# Patient Record
Sex: MALE | Race: BLACK OR AFRICAN AMERICAN | NOT HISPANIC OR LATINO | Employment: UNEMPLOYED | URBAN - NONMETROPOLITAN AREA
[De-identification: names, ages, dates, MRNs, and addresses within clinical notes are randomized per-mention and may not be internally consistent; named-entity substitution may affect disease eponyms.]

---

## 2017-07-08 ENCOUNTER — APPOINTMENT (OUTPATIENT)
Dept: CT IMAGING | Facility: HOSPITAL | Age: 82
End: 2017-07-08

## 2017-07-08 ENCOUNTER — HOSPITAL ENCOUNTER (INPATIENT)
Facility: HOSPITAL | Age: 82
LOS: 4 days | Discharge: HOME | End: 2017-07-12
Attending: HOSPITALIST | Admitting: HOSPITALIST

## 2017-07-08 ENCOUNTER — APPOINTMENT (OUTPATIENT)
Dept: CARDIOLOGY | Facility: HOSPITAL | Age: 82
End: 2017-07-08
Attending: INTERNAL MEDICINE

## 2017-07-08 PROBLEM — I48.91 ATRIAL FIBRILLATION (HCC): Status: ACTIVE | Noted: 2017-07-08

## 2017-07-08 LAB
ALBUMIN SERPL-MCNC: 3.5 G/DL (ref 3.4–4.8)
ALBUMIN/GLOB SERPL: 1.2 G/DL (ref 1.1–1.8)
ALP SERPL-CCNC: 76 U/L (ref 38–126)
ALT SERPL W P-5'-P-CCNC: 47 U/L (ref 21–72)
ANION GAP SERPL CALCULATED.3IONS-SCNC: 9 MMOL/L (ref 5–15)
APTT PPP: 105.2 SECONDS (ref 20–40.3)
APTT PPP: 105.9 SECONDS (ref 20–40.3)
APTT PPP: 67.2 SECONDS (ref 20–40.3)
APTT PPP: 72.2 SECONDS (ref 20–40.3)
AST SERPL-CCNC: 44 U/L (ref 17–59)
BH CV ECHO MEAS - AO MAX PG (FULL): 4 MMHG
BH CV ECHO MEAS - AO MAX PG: 11.1 MMHG
BH CV ECHO MEAS - AO MEAN PG (FULL): 3.2 MMHG
BH CV ECHO MEAS - AO MEAN PG: 7.5 MMHG
BH CV ECHO MEAS - AO ROOT AREA: 7.3 CM^2
BH CV ECHO MEAS - AO ROOT DIAM: 3 CM
BH CV ECHO MEAS - AO V2 MAX: 167 CM/SEC
BH CV ECHO MEAS - AO V2 MEAN: 133.1 CM/SEC
BH CV ECHO MEAS - AO V2 VTI: 28.6 CM
BH CV ECHO MEAS - AVA(I,A): 2.3 CM^2
BH CV ECHO MEAS - AVA(I,D): 2.3 CM^2
BH CV ECHO MEAS - AVA(V,A): 2.7 CM^2
BH CV ECHO MEAS - AVA(V,D): 2.7 CM^2
BH CV ECHO MEAS - EDV(CUBED): 111.3 ML
BH CV ECHO MEAS - EDV(TEICH): 108 ML
BH CV ECHO MEAS - EF(CUBED): 40.5 %
BH CV ECHO MEAS - EF(MOD-SP4): 40 %
BH CV ECHO MEAS - EF(TEICH): 33.4 %
BH CV ECHO MEAS - ESV(CUBED): 66.3 ML
BH CV ECHO MEAS - ESV(TEICH): 72 ML
BH CV ECHO MEAS - FS: 15.9 %
BH CV ECHO MEAS - IVS/LVPW: 0.9
BH CV ECHO MEAS - IVSD: 1.5 CM
BH CV ECHO MEAS - LA DIMENSION: 6 CM
BH CV ECHO MEAS - LA/AO: 2
BH CV ECHO MEAS - LV MASS(C)D: 335 GRAMS
BH CV ECHO MEAS - LV MAX PG: 7.1 MMHG
BH CV ECHO MEAS - LV MEAN PG: 4.3 MMHG
BH CV ECHO MEAS - LV V1 MAX: 133.5 CM/SEC
BH CV ECHO MEAS - LV V1 MEAN: 96.8 CM/SEC
BH CV ECHO MEAS - LV V1 VTI: 19.8 CM
BH CV ECHO MEAS - LVIDD: 4.8 CM
BH CV ECHO MEAS - LVIDS: 4 CM
BH CV ECHO MEAS - LVOT AREA (M): 3.5 CM^2
BH CV ECHO MEAS - LVOT AREA: 3.3 CM^2
BH CV ECHO MEAS - LVOT DIAM: 2.1 CM
BH CV ECHO MEAS - LVPWD: 1.7 CM
BH CV ECHO MEAS - MR MAX PG: 100.7 MMHG
BH CV ECHO MEAS - MR MAX VEL: 501.7 CM/SEC
BH CV ECHO MEAS - MV A MAX VEL: 34.5 CM/SEC
BH CV ECHO MEAS - MV DEC SLOPE: 623.1 CM/SEC^2
BH CV ECHO MEAS - MV E MAX VEL: 108.3 CM/SEC
BH CV ECHO MEAS - MV E/A: 3.1
BH CV ECHO MEAS - MV P1/2T MAX VEL: 107.5 CM/SEC
BH CV ECHO MEAS - MV P1/2T: 50.5 MSEC
BH CV ECHO MEAS - MVA P1/2T LCG: 2 CM^2
BH CV ECHO MEAS - MVA(P1/2T): 4.4 CM^2
BH CV ECHO MEAS - PA MAX PG: 1.7 MMHG
BH CV ECHO MEAS - PA V2 MAX: 64.5 CM/SEC
BH CV ECHO MEAS - RAP SYSTOLE: 10 MMHG
BH CV ECHO MEAS - RVSP: 52.9 MMHG
BH CV ECHO MEAS - SV(AO): 207.8 ML
BH CV ECHO MEAS - SV(CUBED): 45 ML
BH CV ECHO MEAS - SV(LVOT): 66 ML
BH CV ECHO MEAS - SV(TEICH): 36.1 ML
BH CV ECHO MEAS - TR MAX VEL: 327.4 CM/SEC
BILIRUB SERPL-MCNC: 0.6 MG/DL (ref 0.2–1.3)
BUN BLD-MCNC: 22 MG/DL (ref 7–21)
BUN/CREAT SERPL: 21.8 (ref 7–25)
CALCIUM SPEC-SCNC: 8.1 MG/DL (ref 8.4–10.2)
CHLORIDE SERPL-SCNC: 98 MMOL/L (ref 95–110)
CK MB SERPL-CCNC: 2.35 NG/ML (ref 0–5)
CK SERPL-CCNC: 87 U/L (ref 55–170)
CO2 SERPL-SCNC: 27 MMOL/L (ref 22–31)
CREAT BLD-MCNC: 1.01 MG/DL (ref 0.7–1.3)
D-DIMER, QUANTITATIVE (MAD,POW, STR): 1643 NG/ML (FEU) (ref 0–470)
DEPRECATED RDW RBC AUTO: 51.4 FL (ref 35.1–43.9)
ERYTHROCYTE [DISTWIDTH] IN BLOOD BY AUTOMATED COUNT: 14.3 % (ref 11.5–14.5)
GFR SERPL CREATININE-BSD FRML MDRD: 84 ML/MIN/1.73 (ref 42–98)
GLOBULIN UR ELPH-MCNC: 3 GM/DL (ref 2.3–3.5)
GLUCOSE BLD-MCNC: 116 MG/DL (ref 60–100)
HCT VFR BLD AUTO: 33.5 % (ref 39–49)
HGB BLD-MCNC: 11.2 G/DL (ref 13.7–17.3)
INR PPP: 1.18 (ref 0.8–1.2)
LV EF 2D ECHO EST: 36 %
MCH RBC QN AUTO: 32.8 PG (ref 26.5–34)
MCHC RBC AUTO-ENTMCNC: 33.4 G/DL (ref 31.5–36.3)
MCV RBC AUTO: 98.2 FL (ref 80–98)
NT-PROBNP SERPL-MCNC: 4050 PG/ML (ref 0–1800)
PLATELET # BLD AUTO: 173 10*3/MM3 (ref 150–450)
PMV BLD AUTO: 10.9 FL (ref 8–12)
POTASSIUM BLD-SCNC: 4.7 MMOL/L (ref 3.5–5.1)
PROT SERPL-MCNC: 6.5 G/DL (ref 6.3–8.6)
PROTHROMBIN TIME: 15 SECONDS (ref 11.1–15.3)
RBC # BLD AUTO: 3.41 10*6/MM3 (ref 4.37–5.74)
SODIUM BLD-SCNC: 134 MMOL/L (ref 137–145)
TROPONIN I SERPL-MCNC: 0.17 NG/ML
TROPONIN I SERPL-MCNC: 0.18 NG/ML
TROPONIN I SERPL-MCNC: 0.2 NG/ML
WBC NRBC COR # BLD: 5.88 10*3/MM3 (ref 3.2–9.8)

## 2017-07-08 PROCEDURE — 80053 COMPREHEN METABOLIC PANEL: CPT | Performed by: HOSPITALIST

## 2017-07-08 PROCEDURE — 85610 PROTHROMBIN TIME: CPT | Performed by: HOSPITALIST

## 2017-07-08 PROCEDURE — 25010000002 ONDANSETRON PER 1 MG: Performed by: HOSPITALIST

## 2017-07-08 PROCEDURE — 93010 ELECTROCARDIOGRAM REPORT: CPT | Performed by: INTERNAL MEDICINE

## 2017-07-08 PROCEDURE — 82550 ASSAY OF CK (CPK): CPT | Performed by: HOSPITALIST

## 2017-07-08 PROCEDURE — 84484 ASSAY OF TROPONIN QUANT: CPT | Performed by: HOSPITALIST

## 2017-07-08 PROCEDURE — 93005 ELECTROCARDIOGRAM TRACING: CPT | Performed by: HOSPITALIST

## 2017-07-08 PROCEDURE — 93306 TTE W/DOPPLER COMPLETE: CPT | Performed by: INTERNAL MEDICINE

## 2017-07-08 PROCEDURE — 85730 THROMBOPLASTIN TIME PARTIAL: CPT | Performed by: HOSPITALIST

## 2017-07-08 PROCEDURE — 83880 ASSAY OF NATRIURETIC PEPTIDE: CPT | Performed by: HOSPITALIST

## 2017-07-08 PROCEDURE — 25010000002 HEPARIN (PORCINE) PER 1000 UNITS: Performed by: HOSPITALIST

## 2017-07-08 PROCEDURE — 74176 CT ABD & PELVIS W/O CONTRAST: CPT

## 2017-07-08 PROCEDURE — 99233 SBSQ HOSP IP/OBS HIGH 50: CPT | Performed by: INTERNAL MEDICINE

## 2017-07-08 PROCEDURE — 93306 TTE W/DOPPLER COMPLETE: CPT

## 2017-07-08 PROCEDURE — 85379 FIBRIN DEGRADATION QUANT: CPT | Performed by: HOSPITALIST

## 2017-07-08 PROCEDURE — 82553 CREATINE MB FRACTION: CPT | Performed by: HOSPITALIST

## 2017-07-08 PROCEDURE — 85027 COMPLETE CBC AUTOMATED: CPT | Performed by: HOSPITALIST

## 2017-07-08 RX ORDER — ONDANSETRON 4 MG/1
4 TABLET, ORALLY DISINTEGRATING ORAL EVERY 6 HOURS PRN
Status: DISCONTINUED | OUTPATIENT
Start: 2017-07-08 | End: 2017-07-12 | Stop reason: HOSPADM

## 2017-07-08 RX ORDER — MORPHINE SULFATE 2 MG/ML
1 INJECTION, SOLUTION INTRAMUSCULAR; INTRAVENOUS EVERY 4 HOURS PRN
Status: DISCONTINUED | OUTPATIENT
Start: 2017-07-08 | End: 2017-07-12 | Stop reason: HOSPADM

## 2017-07-08 RX ORDER — HEPARIN SODIUM 5000 [USP'U]/ML
3000 INJECTION, SOLUTION INTRAVENOUS; SUBCUTANEOUS AS NEEDED
Status: DISCONTINUED | OUTPATIENT
Start: 2017-07-08 | End: 2017-07-09

## 2017-07-08 RX ORDER — ATORVASTATIN CALCIUM 10 MG/1
10 TABLET, FILM COATED ORAL NIGHTLY
COMMUNITY

## 2017-07-08 RX ORDER — ONDANSETRON 4 MG/1
4 TABLET, FILM COATED ORAL EVERY 6 HOURS PRN
Status: DISCONTINUED | OUTPATIENT
Start: 2017-07-08 | End: 2017-07-12 | Stop reason: HOSPADM

## 2017-07-08 RX ORDER — SODIUM CHLORIDE 0.9 % (FLUSH) 0.9 %
1-10 SYRINGE (ML) INJECTION AS NEEDED
Status: DISCONTINUED | OUTPATIENT
Start: 2017-07-08 | End: 2017-07-12 | Stop reason: HOSPADM

## 2017-07-08 RX ORDER — HEPARIN SODIUM 10000 [USP'U]/100ML
1000 INJECTION, SOLUTION INTRAVENOUS
Status: DISCONTINUED | OUTPATIENT
Start: 2017-07-08 | End: 2017-07-09

## 2017-07-08 RX ORDER — TAMSULOSIN HYDROCHLORIDE 0.4 MG/1
1 CAPSULE ORAL NIGHTLY
COMMUNITY

## 2017-07-08 RX ORDER — NALOXONE HCL 0.4 MG/ML
0.4 VIAL (ML) INJECTION
Status: DISCONTINUED | OUTPATIENT
Start: 2017-07-08 | End: 2017-07-12 | Stop reason: HOSPADM

## 2017-07-08 RX ORDER — HEPARIN SODIUM 5000 [USP'U]/ML
2000 INJECTION, SOLUTION INTRAVENOUS; SUBCUTANEOUS AS NEEDED
Status: DISCONTINUED | OUTPATIENT
Start: 2017-07-08 | End: 2017-07-09

## 2017-07-08 RX ORDER — ATORVASTATIN CALCIUM 10 MG/1
10 TABLET, FILM COATED ORAL NIGHTLY
Status: DISCONTINUED | OUTPATIENT
Start: 2017-07-08 | End: 2017-07-12 | Stop reason: HOSPADM

## 2017-07-08 RX ORDER — AMLODIPINE BESYLATE 5 MG/1
5 TABLET ORAL DAILY
Status: DISCONTINUED | OUTPATIENT
Start: 2017-07-08 | End: 2017-07-08

## 2017-07-08 RX ORDER — POLYETHYLENE GLYCOL 3350 17 G/17G
17 POWDER, FOR SOLUTION ORAL DAILY PRN
Status: DISCONTINUED | OUTPATIENT
Start: 2017-07-08 | End: 2017-07-12 | Stop reason: HOSPADM

## 2017-07-08 RX ORDER — ONDANSETRON 4 MG/1
4 TABLET, ORALLY DISINTEGRATING ORAL EVERY 6 HOURS PRN
Status: DISCONTINUED | OUTPATIENT
Start: 2017-07-08 | End: 2017-07-08 | Stop reason: SDUPTHER

## 2017-07-08 RX ORDER — AMLODIPINE BESYLATE 5 MG/1
5 TABLET ORAL DAILY
COMMUNITY
End: 2017-07-12 | Stop reason: HOSPADM

## 2017-07-08 RX ORDER — TAMSULOSIN HYDROCHLORIDE 0.4 MG/1
0.4 CAPSULE ORAL NIGHTLY
Status: DISCONTINUED | OUTPATIENT
Start: 2017-07-08 | End: 2017-07-12 | Stop reason: HOSPADM

## 2017-07-08 RX ORDER — ONDANSETRON 2 MG/ML
4 INJECTION INTRAMUSCULAR; INTRAVENOUS EVERY 6 HOURS PRN
Status: DISCONTINUED | OUTPATIENT
Start: 2017-07-08 | End: 2017-07-12 | Stop reason: HOSPADM

## 2017-07-08 RX ORDER — ONDANSETRON 4 MG/1
4 TABLET, FILM COATED ORAL EVERY 6 HOURS PRN
Status: DISCONTINUED | OUTPATIENT
Start: 2017-07-08 | End: 2017-07-08 | Stop reason: SDUPTHER

## 2017-07-08 RX ORDER — ONDANSETRON 2 MG/ML
4 INJECTION INTRAMUSCULAR; INTRAVENOUS EVERY 6 HOURS PRN
Status: DISCONTINUED | OUTPATIENT
Start: 2017-07-08 | End: 2017-07-08 | Stop reason: SDUPTHER

## 2017-07-08 RX ORDER — POLYETHYLENE GLYCOL 3350 17 G/17G
17 POWDER, FOR SOLUTION ORAL DAILY PRN
COMMUNITY

## 2017-07-08 RX ORDER — ISOSORBIDE MONONITRATE 30 MG/1
30 TABLET, EXTENDED RELEASE ORAL
Status: DISCONTINUED | OUTPATIENT
Start: 2017-07-08 | End: 2017-07-12 | Stop reason: HOSPADM

## 2017-07-08 RX ORDER — ASPIRIN 81 MG/1
81 TABLET ORAL DAILY
Status: DISCONTINUED | OUTPATIENT
Start: 2017-07-08 | End: 2017-07-12 | Stop reason: HOSPADM

## 2017-07-08 RX ADMIN — DILTIAZEM HYDROCHLORIDE 5 MG/HR: 5 INJECTION INTRAVENOUS at 12:14

## 2017-07-08 RX ADMIN — ONDANSETRON 4 MG: 2 INJECTION INTRAMUSCULAR; INTRAVENOUS at 12:03

## 2017-07-08 RX ADMIN — TAMSULOSIN HYDROCHLORIDE 0.4 MG: 0.4 CAPSULE ORAL at 20:50

## 2017-07-08 RX ADMIN — ONDANSETRON 4 MG: 4 TABLET, ORALLY DISINTEGRATING ORAL at 06:53

## 2017-07-08 RX ADMIN — METOPROLOL TARTRATE 25 MG: 25 TABLET ORAL at 12:24

## 2017-07-08 RX ADMIN — ATORVASTATIN CALCIUM 10 MG: 10 TABLET, FILM COATED ORAL at 20:50

## 2017-07-08 RX ADMIN — ISOSORBIDE MONONITRATE 30 MG: 30 TABLET, EXTENDED RELEASE ORAL at 12:24

## 2017-07-08 RX ADMIN — HEPARIN SODIUM 900 UNITS/HR: 10000 INJECTION, SOLUTION INTRAVENOUS at 06:48

## 2017-07-08 NOTE — CONSULTS
Cardiology at [facility]  Cardiovascular Consultation Note      Alexander Zamora  333/1  [id number]  8/9/1925    DATE OF ADMISSION: 7/8/2017  DATE OF CONSULTATION:  7/8/2017    Jose R Sanchez MD  Treatment Team:   Attending Provider: Vinod Snatiago MD  Consulting Physician: Lexx Byers MD  Admitting Provider: Vinod Santiago MD    No chief complaint on file.      Chief complaint/ Reason for Consultation: Atrial fibrillation with rapid ventricular response of unknown duration and intraventricular conduction delay of left bundle branch block type pattern with mildly abnormal troponin maybe multifactorial in etiology      History of Present Illness: 89 years old patient with a past medical history significant for hypertension, hyperlipidemia and history of for dizziness thought to be vertigo had similar episode 2 years ago.  Had mild  shortness of breath with left lower quadrant abdominal pain and nauseous.  He went to the local facility Hospital, EKG atrial fibrillation with a rapid ventricular sponge and underlying intraventricular conduction delay.  Mildly abnormal troponin with elevated proBNP, clinically not in congestive heart failure.  Patient started on IV heparin and transferred to our facility.  Family present in the room at the time of evaluation.  Patient denies any typical chest pain denies orthopnea or PND.  No fever cough which was reported.  No syncopal episode reported as..  No dysuria or hematuria reported.  And history of BPH on medication.    Past Medical History:   Diagnosis Date   • Nausea    • Vertigo        Past Surgical History:   Procedure Laterality Date   • CHOLECYSTECTOMY     • HERNIA REPAIR         No current facility-administered medications on file prior to encounter.      No current outpatient prescriptions on file prior to encounter.       Social History     Social History   • Marital status:      Spouse name: N/A   • Number of children: N/A   •  "Years of education: N/A     Occupational History   • Not on file.     Social History Main Topics   • Smoking status: Never Smoker   • Smokeless tobacco: Not on file   • Alcohol use Not on file   • Drug use: Not on file   • Sexual activity: Not on file     Other Topics Concern   • Not on file     Social History Narrative   • No narrative on file           REVIEW OF SYSTEMS:   Review of Systems   Constitution: Negative for decreased appetite, fever and night sweats.   HENT: Negative for congestion and headaches.    Cardiovascular: Negative for chest pain, orthopnea, palpitations, paroxysmal nocturnal dyspnea and syncope.   Endocrine: Negative for polydipsia and polyphagia.   Musculoskeletal: Negative for joint swelling.   Gastrointestinal: Positive for abdominal pain and nausea. Negative for diarrhea, dysphagia and vomiting.   Genitourinary: Negative for dysuria and frequency.   Neurological: Positive for vertigo. Negative for focal weakness, numbness and seizures.   Psychiatric/Behavioral: Negative for altered mental status.         Objective:     Vitals:    07/08/17 0439 07/08/17 0452 07/08/17 0726 07/08/17 0807   BP: 113/82   111/70   BP Location: Right arm   Left arm   Patient Position: Lying   Sitting   Pulse: 108 113 115 73   Resp: 18   18   Temp: 97.9 °F (36.6 °C)   97.6 °F (36.4 °C)   TempSrc: Tympanic   Temporal Artery    SpO2: 96%   96%   Weight: 165 lb 1.6 oz (74.9 kg)      Height: 70\" (177.8 cm)        Body mass index is 23.69 kg/(m^2).  Flowsheet Rows         First Filed Value    Admission Height  70\" (177.8 cm) Documented at 07/08/2017 0439    Admission Weight  165 lb 1.6 oz (74.9 kg) Documented at 07/08/2017 0439        No intake or output data in the 24 hours ending 07/08/17 0936      Physical Exam   Physical Exam  Constitutional: He is oriented to person, place, and time. He appears well-developed and well-nourished.   HENT:   Head: Normocephalic and atraumatic.   Nose: Nose normal.   Mouth/Throat: " Oropharynx is clear and moist.   Eyes: Conjunctivae are normal. Pupils are equal, round, and reactive to light. No scleral icterus.   Neck: No tracheal deviation present.   Cardiovascular: Normal heart sounds. Exam reveals no friction rub.   Irregularly irregular   Pulmonary/Chest: Effort normal and breath sounds normal. No respiratory distress. He has no wheezes. He has no rales.   Abdominal: Soft. Bowel sounds are normal. He exhibits no distension. There is no tenderness.   Musculoskeletal: Normal range of motion.   Neurological: He is alert and oriented to person, place, and time.   Skin: Skin is warm and dry.   Radiology Review    CT Abdomen Pelvis Without Contrast   Final Result   CONCLUSION: Cardiomegaly. Small bilateral pleural effusions   larger on the right than on the left. Infiltrative changes right   middle lobe and right lower lobe either pneumonitis or   atelectasis.      Subtle calcifications within the renal parenchyma,   nephrocalcinosis. Kidneys otherwise unremarkable. Degenerative   changes lumbar spine. Evidence of prior cholecystectomy. CT   abdomen, pelvis without contrast is otherwise unremarkable.      Electronically signed by:  Jose King MD  7/8/2017 9:30 AM CDT   Workstation: [id number]          Lab Results:  Lab Results (last 24 hours)     Procedure Component Value Units Date/Time    CBC (No Diff) [[id number]]  (Abnormal) Collected:  07/08/17 0558    Specimen:  Blood Updated:  07/08/17 0614     WBC 5.88 10*3/mm3      RBC 3.41 (L) 10*6/mm3      Hemoglobin 11.2 (L) g/dL      Hematocrit 33.5 (L) %      MCV 98.2 (H) fL      MCH 32.8 pg      MCHC 33.4 g/dL      RDW 14.3 %      RDW-SD 51.4 (H) fl      MPV 10.9 fL      Platelets 173 10*3/mm3     CK [[id number]]  (Normal) Collected:  07/08/17 0558    Specimen:  Blood Updated:  07/08/17 0634     Creatine Kinase 87 U/L     Comprehensive Metabolic Panel [[id number]]  (Abnormal) Collected:  07/08/17 0558    Specimen:  Blood Updated:  07/08/17 0634      Glucose 116 (H) mg/dL      BUN 22 (H) mg/dL      Creatinine 1.01 mg/dL      Sodium 134 (L) mmol/L      Potassium 4.7 mmol/L      Chloride 98 mmol/L      CO2 27.0 mmol/L      Calcium 8.1 (L) mg/dL      Total Protein 6.5 g/dL      Albumin 3.50 g/dL      ALT (SGPT) 47 U/L      AST (SGOT) 44 U/L      Alkaline Phosphatase 76 U/L      Total Bilirubin 0.6 mg/dL      eGFR  African Amer 84 mL/min/1.73      Globulin 3.0 gm/dL      A/G Ratio 1.2 g/dL      BUN/Creatinine Ratio 21.8     Anion Gap 9.0 mmol/L     Narrative:       The MDRD GFR formula is only valid for adults with stable renal function between ages 18 and 70.    Protime-INR [[id number]]  (Normal) Collected:  07/08/17 0558    Specimen:  Blood Updated:  07/08/17 0643     Protime 15.0 Seconds      INR 1.18    Narrative:       Therapeutic range for most indications is 2.0-3.0 INR,  or 2.5-3.5 for mechanical heart valves.    aPTT [[id number]]  (Abnormal) Collected:  07/08/17 0558    Specimen:  Blood Updated:  07/08/17 0643     .2 (H) seconds     Narrative:       The recommended Heparin therapeutic range is 68-97 seconds.    CK-MB [[id number]]  (Normal) Collected:  07/08/17 0558    Specimen:  Blood Updated:  07/08/17 0644     CKMB 2.35 ng/mL     BNP [[id number]]  (Abnormal) Collected:  07/08/17 0558    Specimen:  Blood Updated:  07/08/17 0704     proBNP 4050.0 (H) pg/mL     Troponin [[id number]]  (Abnormal) Collected:  07/08/17 0558    Specimen:  Blood Updated:  07/08/17 0714     Troponin I 0.195 (C) ng/mL     D-dimer, Quantitative [[id number]]  (Abnormal) Collected:  07/08/17 0558    Specimen:  Blood Updated:  07/08/17 0840     D-Dimer, Quantitative 1643 (H) ng/mL (FEU)     Narrative:       Dimer values <500 ng/ml FEU are FDA approved as aid in diagnosis of deep venous thrombosis and pulmonary embolism.  This test should not be used in an exclusion strategy with pretest probability alone.    A recent guideline regarding diagnosis for pulmonary thomboembolism recommends  an adjusted exclusion criterion of age x 10 ng/ml FEU for patients >50 years of age ([employer] Med 2015; 163: 701-711).          I personally viewed and interpreted the patient's EKG/Telemetry data      Assessment/Plan:     #1 atrial fibrillation unknown durations #2 intraventricular conduction delay #3 mildly abnormal troponin  #4 hypertension #5 hyperlipidemia    89 years old patient with a past medical history significant for hypertension, hyperlipidemia, BPH who admitted to [facility] for evaluation is management of lightheaded and dizziness nauseous and abdominal discomfort.  EKG revealed atrial fibrillation with rapid ventricular response and intraventricular conduction delay.  Patient noted to have mildly abnormal troponin started on IV heparin and transferred to our facility.  Complaining left lower quadrant abdominal pain with associated nauseous.  Family present the room at the time of evaluations.  Patient started running on IV Cardizem still await fluctuating.  Noted a mildly abnormal troponin multifactorial in etiology given the atrial fibrillation with a rapid ventricular response possibly may be underlying mechanism.  We'll arrange an echocardiographic study to assess the left ventricle systolic function.  Multiple different options discussed with the patient and the family regarding the for the villages management.  At present preferred medical management.  We'll start the patient on baby aspirin 81 mg, recommend to continue IV Cardizem and Lipitor.   Will add Lopressor , starting at 25 mg by mouth twice a day for better rate control.  Further recommendation based on patient clinical condition and hospital course          Thank you for allowing me to participate in the care of Alexander Zamora. Feel free to contact me directly with any further questions or concerns.    Lexx Byers MD  07/08/17  9:36 AM.      EMR Dragon/Transcription disclaimer:   Much of this encounter note is an  electronic transcription/translation of spoken language to printed text. The electronic translation of spoken language may permit erroneous, or at times, nonsensical words or phrases to be inadvertently transcribed; Although I have reviewed the note for such errors, some may still exist.

## 2017-07-08 NOTE — NURSING NOTE
Pt arrived via EMS.  Upon arrival pt denied cp, had soa upon ambulating to bed.  Was able to ambulate with standby assistance.  MD notified.

## 2017-07-08 NOTE — PROGRESS NOTES
[facility] Medicine Services  INPATIENT PROGRESS NOTE     LOS: 0 days   Patient Care Team:  Jose R Sanchez MD as PCP - General (Internal Medicine)    Chief Complaint:  <principal problem not specified>      Subjective     Interval History:     Patient Complaints: Patient seen and examined. Resting comfortably.    History taken from: Patient.    Review of Systems:    Review of Systems   Constitutional: Negative for appetite change, chills, diaphoresis and fever.   HENT: Negative for congestion, rhinorrhea, sore throat and trouble swallowing.    Eyes: Negative for visual disturbance.   Respiratory: Positive for shortness of breath. Negative for cough, chest tightness and wheezing.    Cardiovascular: Positive for palpitations. Negative for chest pain and leg swelling.   Gastrointestinal: Negative for abdominal pain, blood in stool, diarrhea, nausea and vomiting.   Endocrine: Negative for cold intolerance and heat intolerance.   Genitourinary: Negative for decreased urine volume and difficulty urinating.   Musculoskeletal: Negative for back pain, gait problem and neck pain.   Skin: Negative for rash.   Neurological: Negative for dizziness, syncope, weakness, light-headedness, numbness and headaches.   Psychiatric/Behavioral: The patient is not nervous/anxious.          Objective     Vital Signs  Temp:  [97.6 °F (36.4 °C)-97.9 °F (36.6 °C)] 97.6 °F (36.4 °C)  Heart Rate:  [] 124  Resp:  [18] 18  BP: (111-122)/(70-82) 122/78    Physical Exam:   Physical Exam   Constitutional: He is oriented to person, place, and time. He appears well-developed and well-nourished.   HENT:   Head: Normocephalic and atraumatic.   Nose: Nose normal.   Eyes: Conjunctivae and EOM are normal. Pupils are equal, round, and reactive to light.   Neck: Normal range of motion. Neck supple. No JVD present. No tracheal deviation present. No thyromegaly present.   Cardiovascular: Normal heart  sounds and intact distal pulses.  An irregularly irregular rhythm present. Tachycardia present.    Pulmonary/Chest: Effort normal. No respiratory distress. He has no wheezes. He has rales. He exhibits no tenderness.   Abdominal: Soft. Bowel sounds are normal. He exhibits no distension. There is no tenderness. There is no rebound and no guarding.   Musculoskeletal: Normal range of motion. He exhibits no edema.   Lymphadenopathy:     He has no cervical adenopathy.   Neurological: He is alert and oriented to person, place, and time. He has normal reflexes. No cranial nerve deficit.   Skin: Skin is warm and dry.   Intact   Psychiatric: He has a normal mood and affect.   Nursing note and vitals reviewed.         Results Review:       Results from last 7 days  Lab Units 07/08/17  0558   SODIUM mmol/L 134*   POTASSIUM mmol/L 4.7   CHLORIDE mmol/L 98   CO2 mmol/L 27.0   BUN mg/dL 22*   CREATININE mg/dL 1.01   GLUCOSE mg/dL 116*   CALCIUM mg/dL 8.1*   BILIRUBIN mg/dL 0.6   ALK PHOS U/L 76   ALT (SGPT) U/L 47   AST (SGOT) U/L 44               Results from last 7 days  Lab Units 07/08/17  0558   WBC 10*3/mm3 5.88   HEMOGLOBIN g/dL 11.2*   HEMATOCRIT % 33.5*   PLATELETS 10*3/mm3 173       Lab Results   Component Value Date    CKTOTAL 87 07/08/2017    CKMB 2.35 07/08/2017    TROPONINI 0.195 (C) 07/08/2017       CO2   Date Value Ref Range Status   07/08/2017 27.0 22.0 - 31.0 mmol/L Final         Results from last 7 days  Lab Units 07/08/17  0558   INR  1.18        Imaging Results (last 7 days)     Procedure Component Value Units Date/Time    CT Abdomen Pelvis Without Contrast [[id number]] Collected:  07/08/17 0903     Updated:  07/08/17 0931    Narrative:       CT abdomen, pelvis without contrast.       CLINICAL INDICATION: Abdominal pain.    Nausea.    COMPARISON: None       TECHNIQUE: Noncontrast helical scanning with axial and coronal  reformations. Soft tissue, lung, and bone windows reviewed.    This exam was performed  according to our departmental  dose-optimization program, which includes automated exposure  control, adjustment of the mA and/or kV according to patient size  and/or use of iterative reconstruction technique.    ABDOMEN CT FINDINGS: The visualized lung bases show minor  dependent changes. Scan sensitivity for abnormalities of the  organs is limited by the lack of IV contrast. Within this  limitation the following observations are made.    Cardiomegaly. Small bilateral pleural effusions, larger on the  right than left. Infiltrative changes right middle lobe and right  lower lobe either pneumonitis or atelectasis.    Normal liver. Normal spleen. Normal pancreas. The gallbladder  surgically absent. Subtle calcifications in the renal parenchyma,  nephrocalcinosis. The kidneys are otherwise unremarkable. No  masses or evidence of obstruction.    Densely calcified but normal caliber abdominal aorta. No  retroperitoneal adenopathy. Diffuse increased stool throughout  the colon. The bowel is otherwise unremarkable. Normal appendix.  Degenerative changes lumbar spine. Mild arthritic changes of both  hips. No pelvic masses.          Impression:       CONCLUSION: Cardiomegaly. Small bilateral pleural effusions  larger on the right than on the left. Infiltrative changes right  middle lobe and right lower lobe either pneumonitis or  atelectasis.    Subtle calcifications within the renal parenchyma,  nephrocalcinosis. Kidneys otherwise unremarkable. Degenerative  changes lumbar spine. Evidence of prior cholecystectomy. CT  abdomen, pelvis without contrast is otherwise unremarkable.    Electronically signed by:  Jose King MD  7/8/2017 9:30 AM CDT  Workstation: [id number]                                    Medication Review:   Current Facility-Administered Medications   Medication Dose Route Frequency Provider Last Rate Last Dose   • aspirin EC tablet 81 mg  81 mg Oral Daily Lexx Byers MD       • atorvastatin (LIPITOR) tablet  10 mg  10 mg Oral Nightly Vinod Santiago MD       • diltiaZEM (CARDIZEM) 125 mg in 100 mL sodium chloride 0.9% (total volume 125 mL)  5 mg/hr Intravenous Continuous Lexx Byers MD       • heparin 58321 units/250 mL (100 units/mL) in 0.45% NaCl infusion  1,000 Units/hr Intravenous Titrated Vinod Santiago MD 9 mL/hr at 07/08/17 0648 900 Units/hr at 07/08/17 0648    And   • heparin (porcine) 5000 UNIT/ML injection 3,000 Units  3,000 Units Intravenous PRN Vinod Santiago MD        And   • heparin (porcine) 5000 UNIT/ML injection 2,000 Units  2,000 Units Intravenous PRN Vinod Santiago MD       • isosorbide mononitrate (IMDUR) 24 hr tablet 30 mg  30 mg Oral Q24H Lexx Byers MD       • metoprolol tartrate (LOPRESSOR) tablet 25 mg  25 mg Oral Q12H Lexx Byers MD       • Morphine sulfate (PF) injection 1 mg  1 mg Intravenous Q4H PRN Vinod Santiago MD        And   • naloxone (NARCAN) injection 0.4 mg  0.4 mg Intravenous Q5 Min PRN Vinod Santiago MD       • ondansetron (ZOFRAN) tablet 4 mg  4 mg Oral Q6H PRN Vinod Santiago MD        Or   • ondansetron ODT (ZOFRAN-ODT) disintegrating tablet 4 mg  4 mg Oral Q6H PRN Vinod Santiago MD        Or   • ondansetron (ZOFRAN) injection 4 mg  4 mg Intravenous Q6H PRN Vinod Santiago MD       • polyethylene glycol (MIRALAX) powder 17 g  17 g Oral Daily PRN Vinod Santiago MD       • sodium chloride 0.9 % flush 1-10 mL  1-10 mL Intravenous PRN Vinod Santiago MD       • tamsulosin (FLOMAX) 24 hr capsule 0.4 mg  0.4 mg Oral Nightly Vinod Santiago MD             Assessment/Plan     Active Problems:    Atrial fibrillation          -Continue Cardizem drip.  -Cardiology f/u appreciated.  -DVT/GI prophylaxis in place.  -Resume home medications as prior to coming to hospital.  -Continue monitoring patient in hospital setting and treat as course dictates.          This document has been electronically signed by Torrie Mendes MD on July 8, 2017 11:59 AM         EMR Dragon/Transcription disclaimer:   Much of this encounter note is an electronic transcription/translation of spoken language to printed text. The electronic translation of spoken language may permit erroneous, or at times, nonsensical words or phrases to be inadvertently transcribed; Although I have reviewed the note for such errors, some may still exist.

## 2017-07-08 NOTE — H&P
[facility] Medicine Admission      Date of Admission: 7/8/2017      Primary Care Physician: Jose R Sanchez MD    Following information is obtained partially from patient, family and/or medical records.    Time of patient encounter: 7/8/17 7:30 AM    Chief Complaint: Nausea    HPI: Pt is a 89 y.o. male presenting with  Abdominal Pain   This is a new problem. The current episode started 1 to 4 weeks ago. The onset quality is gradual. The problem occurs intermittently. The problem has been waxing and waning. The pain is located in the LLQ. The pain is mild. Associated symptoms include constipation and nausea. Pertinent negatives include no diarrhea, dysuria or vomiting. Nothing aggravates the pain. The pain is relieved by nothing. He has tried nothing for the symptoms.   Patient is being transferred here from [facility] due to elevated troponins and new-onset left bundle branch block.  It should be noted the patient has never had an EKG before for comparison.  He has been started on heparin drip and transferred to our facility for further evaluation by cardiologist.  Additionally, he is is noted to be in atrial fibrillation with a rate of 140s.      Concurrent Medical History: There is no problem list on file for this patient.      Past Surgical History: No past surgical history on file.    Family History: family history is not on file.    Social History:  reports that he has never smoked. He does not have any smokeless tobacco history on file.    Allergies:   Allergies   Allergen Reactions   • Penicillins Rash     Home Medications:   Prior to Admission medications    Medication Sig Start Date End Date Taking? Authorizing Provider   amLODIPine (NORVASC) 5 MG tablet Take 5 mg by mouth Daily.    Historical Provider, MD   atorvastatin (LIPITOR) 10 MG tablet Take 10 mg by mouth Every Night.    Historical Provider, MD   magnesium hydroxide (MILK OF MAGNESIA) 400  MG/5ML suspension Take 30 mL by mouth Daily As Needed for Constipation.    Historical Provider, MD   polyethylene glycol (MIRALAX) packet Take 17 g by mouth Daily As Needed.    Historical Provider, MD   tamsulosin (FLOMAX) 0.4 MG capsule 24 hr capsule Take 1 capsule by mouth Every Night.    Historical Provider, MD     Review of Systems:  Review of Systems   Constitutional: Negative.    HENT: Negative.    Eyes: Negative.    Respiratory: Negative.    Cardiovascular: Negative.    Gastrointestinal: Positive for abdominal pain, constipation and nausea. Negative for diarrhea and vomiting.   Endocrine: Negative.    Genitourinary: Negative.  Negative for dysuria.   Skin: Negative.    Neurological: Negative.       Otherwise complete ROS is negative except as mentioned above and in HPI.    Physical Exam:   Temp:  [97.9 °F (36.6 °C)] 97.9 °F (36.6 °C)  Heart Rate:  [108-115] 115  Resp:  [18] 18  BP: (113)/(82) 113/82  Physical Exam   Constitutional: He is oriented to person, place, and time. He appears well-developed and well-nourished.   HENT:   Head: Normocephalic and atraumatic.   Nose: Nose normal.   Mouth/Throat: Oropharynx is clear and moist.   Eyes: Conjunctivae are normal. Pupils are equal, round, and reactive to light. No scleral icterus.   Neck: No tracheal deviation present.   Cardiovascular: Normal heart sounds.  Exam reveals no friction rub.    Irregularly irregular   Pulmonary/Chest: Effort normal and breath sounds normal. No respiratory distress. He has no wheezes. He has no rales.   Abdominal: Soft. Bowel sounds are normal. He exhibits no distension. There is no tenderness.   Musculoskeletal: Normal range of motion.   Neurological: He is alert and oriented to person, place, and time.   Skin: Skin is warm and dry.     Results Reviewed:  I have personally reviewed current lab, radiology, and data and agree with results.  Lab Results (last 24 hours)     Procedure Component Value Units Date/Time    CBC (No Diff)  [[id number]]  (Abnormal) Collected:  07/08/17 0558    Specimen:  Blood Updated:  07/08/17 0614     WBC 5.88 10*3/mm3      RBC 3.41 (L) 10*6/mm3      Hemoglobin 11.2 (L) g/dL      Hematocrit 33.5 (L) %      MCV 98.2 (H) fL      MCH 32.8 pg      MCHC 33.4 g/dL      RDW 14.3 %      RDW-SD 51.4 (H) fl      MPV 10.9 fL      Platelets 173 10*3/mm3     CK [[id number]]  (Normal) Collected:  07/08/17 0558    Specimen:  Blood Updated:  07/08/17 0634     Creatine Kinase 87 U/L     Comprehensive Metabolic Panel [[id number]]  (Abnormal) Collected:  07/08/17 0558    Specimen:  Blood Updated:  07/08/17 0634     Glucose 116 (H) mg/dL      BUN 22 (H) mg/dL      Creatinine 1.01 mg/dL      Sodium 134 (L) mmol/L      Potassium 4.7 mmol/L      Chloride 98 mmol/L      CO2 27.0 mmol/L      Calcium 8.1 (L) mg/dL      Total Protein 6.5 g/dL      Albumin 3.50 g/dL      ALT (SGPT) 47 U/L      AST (SGOT) 44 U/L      Alkaline Phosphatase 76 U/L      Total Bilirubin 0.6 mg/dL      eGFR  African Amer 84 mL/min/1.73      Globulin 3.0 gm/dL      A/G Ratio 1.2 g/dL      BUN/Creatinine Ratio 21.8     Anion Gap 9.0 mmol/L     Narrative:       The MDRD GFR formula is only valid for adults with stable renal function between ages 18 and 70.    Protime-INR [[id number]]  (Normal) Collected:  07/08/17 0558    Specimen:  Blood Updated:  07/08/17 0643     Protime 15.0 Seconds      INR 1.18    Narrative:       Therapeutic range for most indications is 2.0-3.0 INR,  or 2.5-3.5 for mechanical heart valves.    aPTT [[id number]]  (Abnormal) Collected:  07/08/17 0558    Specimen:  Blood Updated:  07/08/17 0643     .2 (H) seconds     Narrative:       The recommended Heparin therapeutic range is 68-97 seconds.    CK-MB [[id number]]  (Normal) Collected:  07/08/17 0558    Specimen:  Blood Updated:  07/08/17 0644     CKMB 2.35 ng/mL     BNP [[id number]]  (Abnormal) Collected:  07/08/17 0558    Specimen:  Blood Updated:  07/08/17 0704     proBNP 4050.0 (H) pg/mL      Troponin [[id number]]  (Abnormal) Collected:  07/08/17 0558    Specimen:  Blood Updated:  07/08/17 0714     Troponin I 0.195 (C) ng/mL         Imaging Results (last 24 hours)     ** No results found for the last 24 hours. **        Assessment and Plan:   A. fib RVR possibly new-onset: Will start patient on Cardizem drip.  Obtain echo.  Telemetry.    LBBB possibly new-onset with elevated troponin:  Consult cardiologist.  Patient was already on Cardizem drip upon arrival will continue this for now.  Vinod Santiago MD  07/08/17  7:40 AM

## 2017-07-09 LAB
ALBUMIN SERPL-MCNC: 3.3 G/DL (ref 3.4–4.8)
ALBUMIN/GLOB SERPL: 1.1 G/DL (ref 1.1–1.8)
ALP SERPL-CCNC: 83 U/L (ref 38–126)
ALT SERPL W P-5'-P-CCNC: 41 U/L (ref 21–72)
ANION GAP SERPL CALCULATED.3IONS-SCNC: 8 MMOL/L (ref 5–15)
APTT PPP: 79.7 SECONDS (ref 20–40.3)
APTT PPP: 80.1 SECONDS (ref 20–40.3)
AST SERPL-CCNC: 35 U/L (ref 17–59)
BASOPHILS # BLD AUTO: 0.01 10*3/MM3 (ref 0–0.2)
BASOPHILS NFR BLD AUTO: 0.2 % (ref 0–2)
BILIRUB SERPL-MCNC: 0.9 MG/DL (ref 0.2–1.3)
BUN BLD-MCNC: 17 MG/DL (ref 7–21)
BUN/CREAT SERPL: 17.3 (ref 7–25)
CALCIUM SPEC-SCNC: 8.2 MG/DL (ref 8.4–10.2)
CHLORIDE SERPL-SCNC: 95 MMOL/L (ref 95–110)
CO2 SERPL-SCNC: 25 MMOL/L (ref 22–31)
CREAT BLD-MCNC: 0.98 MG/DL (ref 0.7–1.3)
DEPRECATED RDW RBC AUTO: 50.9 FL (ref 35.1–43.9)
EOSINOPHIL # BLD AUTO: 0.04 10*3/MM3 (ref 0–0.7)
EOSINOPHIL NFR BLD AUTO: 0.7 % (ref 0–7)
ERYTHROCYTE [DISTWIDTH] IN BLOOD BY AUTOMATED COUNT: 14.2 % (ref 11.5–14.5)
GFR SERPL CREATININE-BSD FRML MDRD: 87 ML/MIN/1.73 (ref 42–98)
GLOBULIN UR ELPH-MCNC: 2.9 GM/DL (ref 2.3–3.5)
GLUCOSE BLD-MCNC: 100 MG/DL (ref 60–100)
HCT VFR BLD AUTO: 31.9 % (ref 39–49)
HGB BLD-MCNC: 10.9 G/DL (ref 13.7–17.3)
IMM GRANULOCYTES # BLD: 0.01 10*3/MM3 (ref 0–0.02)
IMM GRANULOCYTES NFR BLD: 0.2 % (ref 0–0.5)
LYMPHOCYTES # BLD AUTO: 0.72 10*3/MM3 (ref 0.6–4.2)
LYMPHOCYTES NFR BLD AUTO: 11.8 % (ref 10–50)
MCH RBC QN AUTO: 33.4 PG (ref 26.5–34)
MCHC RBC AUTO-ENTMCNC: 34.2 G/DL (ref 31.5–36.3)
MCV RBC AUTO: 97.9 FL (ref 80–98)
MONOCYTES # BLD AUTO: 0.5 10*3/MM3 (ref 0–0.9)
MONOCYTES NFR BLD AUTO: 8.2 % (ref 0–12)
NEUTROPHILS # BLD AUTO: 4.81 10*3/MM3 (ref 2–8.6)
NEUTROPHILS NFR BLD AUTO: 78.9 % (ref 37–80)
PLATELET # BLD AUTO: 164 10*3/MM3 (ref 150–450)
PMV BLD AUTO: 11.2 FL (ref 8–12)
POTASSIUM BLD-SCNC: 4.4 MMOL/L (ref 3.5–5.1)
PROT SERPL-MCNC: 6.2 G/DL (ref 6.3–8.6)
RBC # BLD AUTO: 3.26 10*6/MM3 (ref 4.37–5.74)
SODIUM BLD-SCNC: 128 MMOL/L (ref 137–145)
WBC NRBC COR # BLD: 6.09 10*3/MM3 (ref 3.2–9.8)

## 2017-07-09 PROCEDURE — 99232 SBSQ HOSP IP/OBS MODERATE 35: CPT | Performed by: INTERNAL MEDICINE

## 2017-07-09 PROCEDURE — 80053 COMPREHEN METABOLIC PANEL: CPT | Performed by: HOSPITALIST

## 2017-07-09 PROCEDURE — 85730 THROMBOPLASTIN TIME PARTIAL: CPT | Performed by: HOSPITALIST

## 2017-07-09 PROCEDURE — 85025 COMPLETE CBC W/AUTO DIFF WBC: CPT | Performed by: HOSPITALIST

## 2017-07-09 RX ORDER — PANTOPRAZOLE SODIUM 40 MG/10ML
40 INJECTION, POWDER, LYOPHILIZED, FOR SOLUTION INTRAVENOUS
Status: COMPLETED | OUTPATIENT
Start: 2017-07-09 | End: 2017-07-10

## 2017-07-09 RX ORDER — LOSARTAN POTASSIUM 25 MG/1
25 TABLET ORAL
Status: DISCONTINUED | OUTPATIENT
Start: 2017-07-09 | End: 2017-07-12 | Stop reason: HOSPADM

## 2017-07-09 RX ORDER — DILTIAZEM HYDROCHLORIDE 120 MG/1
120 CAPSULE, COATED, EXTENDED RELEASE ORAL
Status: DISCONTINUED | OUTPATIENT
Start: 2017-07-09 | End: 2017-07-09

## 2017-07-09 RX ORDER — DIGOXIN 125 MCG
125 TABLET ORAL
Status: DISCONTINUED | OUTPATIENT
Start: 2017-07-09 | End: 2017-07-12 | Stop reason: HOSPADM

## 2017-07-09 RX ADMIN — ATORVASTATIN CALCIUM 10 MG: 10 TABLET, FILM COATED ORAL at 21:50

## 2017-07-09 RX ADMIN — ONDANSETRON 4 MG: 4 TABLET, ORALLY DISINTEGRATING ORAL at 11:28

## 2017-07-09 RX ADMIN — TAMSULOSIN HYDROCHLORIDE 0.4 MG: 0.4 CAPSULE ORAL at 21:50

## 2017-07-09 RX ADMIN — DIGOXIN 125 MCG: 125 TABLET ORAL at 13:45

## 2017-07-09 RX ADMIN — APIXABAN 2.5 MG: 2.5 TABLET, FILM COATED ORAL at 21:50

## 2017-07-09 RX ADMIN — METOPROLOL TARTRATE 25 MG: 25 TABLET ORAL at 08:08

## 2017-07-09 RX ADMIN — METOPROLOL TARTRATE 25 MG: 25 TABLET ORAL at 21:50

## 2017-07-09 RX ADMIN — PANTOPRAZOLE SODIUM 40 MG: 40 INJECTION, POWDER, FOR SOLUTION INTRAVENOUS at 13:43

## 2017-07-09 RX ADMIN — ISOSORBIDE MONONITRATE 30 MG: 30 TABLET, EXTENDED RELEASE ORAL at 08:08

## 2017-07-09 RX ADMIN — APIXABAN 2.5 MG: 2.5 TABLET, FILM COATED ORAL at 13:44

## 2017-07-09 RX ADMIN — ASPIRIN 81 MG: 81 TABLET, COATED ORAL at 08:07

## 2017-07-09 RX ADMIN — DILTIAZEM HYDROCHLORIDE 5 MG/HR: 5 INJECTION INTRAVENOUS at 10:25

## 2017-07-09 RX ADMIN — LOSARTAN POTASSIUM 25 MG: 25 TABLET ORAL at 16:08

## 2017-07-09 NOTE — PROGRESS NOTES
[facility] Medicine Services  INPATIENT PROGRESS NOTE     LOS: 1 day   Patient Care Team:  Jose R Sanchez MD as PCP - General (Internal Medicine)    Chief Complaint:  <principal problem not specified>      Subjective     Interval History:     Patient Complaints: Patient seen and examined.  This patient continues to complain of shortness of air.    History taken from: Patient.    Review of Systems:    Review of Systems   Constitutional: Negative for appetite change, chills, diaphoresis and fever.   HENT: Negative for congestion, rhinorrhea, sore throat and trouble swallowing.    Eyes: Negative for visual disturbance.   Respiratory: Positive for shortness of breath. Negative for cough, chest tightness and wheezing.    Cardiovascular: Positive for palpitations. Negative for chest pain and leg swelling.   Gastrointestinal: Negative for abdominal pain, blood in stool, diarrhea, nausea and vomiting.   Endocrine: Negative for cold intolerance and heat intolerance.   Genitourinary: Negative for decreased urine volume and difficulty urinating.   Musculoskeletal: Negative for back pain, gait problem and neck pain.   Skin: Negative for rash.   Neurological: Negative for dizziness, syncope, weakness, light-headedness, numbness and headaches.   Psychiatric/Behavioral: The patient is not nervous/anxious.          Objective     Vital Signs  Temp:  [97.2 °F (36.2 °C)-98.2 °F (36.8 °C)] 97.2 °F (36.2 °C)  Heart Rate:  [] 104  Resp:  [18-22] 18  BP: (105-148)/(55-82) 108/58    Physical Exam:   Physical Exam   Constitutional: He is oriented to person, place, and time. He appears well-developed and well-nourished.   HENT:   Head: Normocephalic and atraumatic.   Nose: Nose normal.   Eyes: Conjunctivae and EOM are normal. Pupils are equal, round, and reactive to light.   Neck: Normal range of motion. Neck supple. No JVD present. No tracheal deviation present. No thyromegaly  present.   Cardiovascular: Normal heart sounds and intact distal pulses.  An irregularly irregular rhythm present. Tachycardia present.    Pulmonary/Chest: Effort normal. No respiratory distress. He has no wheezes. He has rales. He exhibits no tenderness.   Abdominal: Soft. Bowel sounds are normal. He exhibits no distension. There is no tenderness. There is no rebound and no guarding.   Musculoskeletal: Normal range of motion. He exhibits no edema.   Lymphadenopathy:     He has no cervical adenopathy.   Neurological: He is alert and oriented to person, place, and time. He has normal reflexes. No cranial nerve deficit.   Skin: Skin is warm and dry.   Intact   Psychiatric: He has a normal mood and affect.   Nursing note and vitals reviewed.         Results Review:       Results from last 7 days  Lab Units 07/09/17 0827 07/08/17 0558   SODIUM mmol/L 128* 134*   POTASSIUM mmol/L 4.4 4.7   CHLORIDE mmol/L 95 98   CO2 mmol/L 25.0 27.0   BUN mg/dL 17 22*   CREATININE mg/dL 0.98 1.01   GLUCOSE mg/dL 100 116*   CALCIUM mg/dL 8.2* 8.1*   BILIRUBIN mg/dL 0.9 0.6   ALK PHOS U/L 83 76   ALT (SGPT) U/L 41 47   AST (SGOT) U/L 35 44               Results from last 7 days  Lab Units 07/09/17 0827 07/08/17 0558   WBC 10*3/mm3 6.09 5.88   HEMOGLOBIN g/dL 10.9* 11.2*   HEMATOCRIT % 31.9* 33.5*   PLATELETS 10*3/mm3 164 173       Lab Results   Component Value Date    CKTOTAL 87 07/08/2017    CKMB 2.35 07/08/2017    TROPONINI 0.176 (C) 07/08/2017       CO2   Date Value Ref Range Status   07/09/2017 25.0 22.0 - 31.0 mmol/L Final         Results from last 7 days  Lab Units 07/08/17  0558   INR  1.18        Imaging Results (last 7 days)     Procedure Component Value Units Date/Time    CT Abdomen Pelvis Without Contrast [[id number]] Collected:  07/08/17 0903     Updated:  07/08/17 0931    Narrative:       CT abdomen, pelvis without contrast.       CLINICAL INDICATION: Abdominal pain.    Nausea.    COMPARISON: None       TECHNIQUE:  Noncontrast helical scanning with axial and coronal  reformations. Soft tissue, lung, and bone windows reviewed.    This exam was performed according to our departmental  dose-optimization program, which includes automated exposure  control, adjustment of the mA and/or kV according to patient size  and/or use of iterative reconstruction technique.    ABDOMEN CT FINDINGS: The visualized lung bases show minor  dependent changes. Scan sensitivity for abnormalities of the  organs is limited by the lack of IV contrast. Within this  limitation the following observations are made.    Cardiomegaly. Small bilateral pleural effusions, larger on the  right than left. Infiltrative changes right middle lobe and right  lower lobe either pneumonitis or atelectasis.    Normal liver. Normal spleen. Normal pancreas. The gallbladder  surgically absent. Subtle calcifications in the renal parenchyma,  nephrocalcinosis. The kidneys are otherwise unremarkable. No  masses or evidence of obstruction.    Densely calcified but normal caliber abdominal aorta. No  retroperitoneal adenopathy. Diffuse increased stool throughout  the colon. The bowel is otherwise unremarkable. Normal appendix.  Degenerative changes lumbar spine. Mild arthritic changes of both  hips. No pelvic masses.          Impression:       CONCLUSION: Cardiomegaly. Small bilateral pleural effusions  larger on the right than on the left. Infiltrative changes right  middle lobe and right lower lobe either pneumonitis or  atelectasis.    Subtle calcifications within the renal parenchyma,  nephrocalcinosis. Kidneys otherwise unremarkable. Degenerative  changes lumbar spine. Evidence of prior cholecystectomy. CT  abdomen, pelvis without contrast is otherwise unremarkable.    Electronically signed by:  Jose King MD  7/8/2017 9:30 AM CDT  Workstation: [id number]                                    Medication Review:   Current Facility-Administered Medications   Medication Dose Route  Frequency Provider Last Rate Last Dose   • aspirin EC tablet 81 mg  81 mg Oral Daily Lexx Byers MD   81 mg at 07/09/17 0807   • atorvastatin (LIPITOR) tablet 10 mg  10 mg Oral Nightly Vinod Santiago MD   10 mg at 07/08/17 2050   • diltiaZEM (CARDIZEM) 125 mg in 100 mL sodium chloride 0.9% (total volume 125 mL)  5 mg/hr Intravenous Continuous Lexx Byers MD 5 mL/hr at 07/09/17 1025 5 mg/hr at 07/09/17 1025   • heparin 35420 units/250 mL (100 units/mL) in 0.45% NaCl infusion  1,000 Units/hr Intravenous Titrated Vinod Santiago MD 8 mL/hr at 07/08/17 1429 800 Units/hr at 07/08/17 1429    And   • heparin (porcine) 5000 UNIT/ML injection 3,000 Units  3,000 Units Intravenous PRN Vinod Santiago MD        And   • heparin (porcine) 5000 UNIT/ML injection 2,000 Units  2,000 Units Intravenous PRN Vinod Santiago MD       • isosorbide mononitrate (IMDUR) 24 hr tablet 30 mg  30 mg Oral Q24H Lexx Byers MD   30 mg at 07/09/17 0808   • metoprolol tartrate (LOPRESSOR) tablet 25 mg  25 mg Oral Q12H Lexx Byers MD   25 mg at 07/09/17 0808   • Morphine sulfate (PF) injection 1 mg  1 mg Intravenous Q4H PRN Vinod Santiago MD        And   • naloxone (NARCAN) injection 0.4 mg  0.4 mg Intravenous Q5 Min PRN Vinod Santiago MD       • ondansetron (ZOFRAN) tablet 4 mg  4 mg Oral Q6H PRN Vinod Santiago MD        Or   • ondansetron ODT (ZOFRAN-ODT) disintegrating tablet 4 mg  4 mg Oral Q6H PRN Vinod Santiago MD        Or   • ondansetron (ZOFRAN) injection 4 mg  4 mg Intravenous Q6H PRN Vinod Santiago MD   4 mg at 07/08/17 1203   • polyethylene glycol (MIRALAX) powder 17 g  17 g Oral Daily PRN Vinod Santiago MD       • sodium chloride 0.9 % flush 1-10 mL  1-10 mL Intravenous PRN Vinod Santiago MD       • tamsulosin (FLOMAX) 24 hr capsule 0.4 mg  0.4 mg Oral Nightly Vinod Santiago MD   0.4 mg at 07/08/17 2050         Assessment/Plan     Active Problems:    Atrial fibrillation          -Continue with Cardizem  drip.  -Resume patient's home medications prior to hospital.  -Cardiology follow-up appreciated.  -DVT GI prophylaxis in place.  -Will continue monitoring patient in hospital setting and treat patient as course dictates.          This document has been electronically signed by Torrie Mendes MD on July 9, 2017 11:22 AM        EMR Dragon/Transcription disclaimer:   Much of this encounter note is an electronic transcription/translation of spoken language to printed text. The electronic translation of spoken language may permit erroneous, or at times, nonsensical words or phrases to be inadvertently transcribed; Although I have reviewed the note for such errors, some may still exist.

## 2017-07-09 NOTE — PROGRESS NOTES
LOS: 1 day   Patient Care Team:  Jose R Sanchez MD as PCP - General (Internal Medicine)    Chief Complaint:  Complaining of nauseous feeling with duration more than 2 week    Nature 1 years old patient with multiple family member present the room at the time of evaluation and past medical history significant for hypertension, hyperlipidemia and history of dizziness.  Patient admitted with mild shortness of breath and left lower quadrant pain and nauseous feeling.  CT scan of the abdomen pathology reported except some fecal material.  History of cholecystectomy.  Echocardiographic study ejection fraction approximately 35% with severely dilated left atrium in dictated of chronic atrial fibrillation.     Review of Systems:   ROS    Objective     Current Facility-Administered Medications   Medication Dose Route Frequency Provider Last Rate Last Dose   • apixaban (ELIQUIS) tablet 2.5 mg  2.5 mg Oral Q12H Lexx Byers MD       • aspirin EC tablet 81 mg  81 mg Oral Daily Lexx Byers MD   81 mg at 07/09/17 0807   • atorvastatin (LIPITOR) tablet 10 mg  10 mg Oral Nightly Vinod Santiago MD   10 mg at 07/08/17 2050   • digoxin (LANOXIN) tablet 125 mcg  125 mcg Oral Daily Lexx Byers MD       • diltiaZEM CD (CARDIZEM CD) 24 hr capsule 120 mg  120 mg Oral Q24H Lexx Byers MD       • isosorbide mononitrate (IMDUR) 24 hr tablet 30 mg  30 mg Oral Q24H Lexx Byers MD   30 mg at 07/09/17 0808   • losartan (COZAAR) tablet 25 mg  25 mg Oral Q24H Lexx Byers MD       • metoprolol tartrate (LOPRESSOR) tablet 25 mg  25 mg Oral Q12H Lexx Byers MD   25 mg at 07/09/17 0808   • Morphine sulfate (PF) injection 1 mg  1 mg Intravenous Q4H PRN Vinod Santiago MD        And   • naloxone (NARCAN) injection 0.4 mg  0.4 mg Intravenous Q5 Min PRN Vinod Santiago MD       • ondansetron (ZOFRAN) tablet 4 mg  4 mg Oral Q6H PRN Vinod Santiago MD        Or   • ondansetron ODT (ZOFRAN-ODT) disintegrating tablet  "4 mg  4 mg Oral Q6H PRN Vinod Santiago MD   4 mg at 07/09/17 1128    Or   • ondansetron (ZOFRAN) injection 4 mg  4 mg Intravenous Q6H PRN Vinod Santiago MD   4 mg at 07/08/17 1203   • pantoprazole (PROTONIX) injection 40 mg  40 mg Intravenous Q AM Lexx Byers MD       • polyethylene glycol (MIRALAX) powder 17 g  17 g Oral Daily PRN Vinod Santiago MD       • sodium chloride 0.9 % flush 1-10 mL  1-10 mL Intravenous PRN Vinod Santiago MD       • tamsulosin (FLOMAX) 24 hr capsule 0.4 mg  0.4 mg Oral Nightly Vinod Santiaog MD   0.4 mg at 07/08/17 2050       Vital Sign Min/Max for last 24 hours  Temp  Min: 97.2 °F (36.2 °C)  Max: 98.2 °F (36.8 °C)   BP  Min: 96/52  Max: 148/82   Pulse  Min: 75  Max: 137   Resp  Min: 18  Max: 22   SpO2  Min: 94 %  Max: 97 %   Flow (L/min)  Min: 2  Max: 2   Weight  Min: 165 lb 14.4 oz (75.3 kg)  Max: 165 lb 14.4 oz (75.3 kg)     Flowsheet Rows         First Filed Value    Admission Height  70\" (177.8 cm) Documented at 07/08/2017 0439    Admission Weight  165 lb 1.6 oz (74.9 kg) Documented at 07/08/2017 0439            Intake/Output Summary (Last 24 hours) at 07/09/17 1143  Last data filed at 07/09/17 0438   Gross per 24 hour   Intake              840 ml   Output             1175 ml   Net             -335 ml       Physical Exam:     General Appearance:    Alert, cooperative, in no acute distress   Lungs:     Clear to auscultation,respirations regular, even and                  unlabored    Heart:    Regular rhythm and normal rate, normal S1 and S2, no            murmur, no gallop, no rub, no click   Chest Wall:    No abnormalities observed   Abdomen:     Normal bowel sounds, no masses, no organomegaly, soft        non-tender, non-distended, no guarding, no rebound                tenderness   Extremities:   Moves all extremities well, no edema, no cyanosis, no             redness   Pulses:   Pulses palpable and equal bilaterally   Skin:   No bleeding, bruising or rash "        Results Review:   I reviewed the patient's new clinical results.  Lab Results   Component Value Date    WBC 6.09 07/09/2017    HGB 10.9 (L) 07/09/2017    HCT 31.9 (L) 07/09/2017    MCV 97.9 07/09/2017     07/09/2017     Lab Results   Component Value Date    GLUCOSE 100 07/09/2017    BUN 17 07/09/2017    CREATININE 0.98 07/09/2017    EGFRIFAFRI 87 07/09/2017    BCR 17.3 07/09/2017    CO2 25.0 07/09/2017    CALCIUM 8.2 (L) 07/09/2017    ALBUMIN 3.30 (L) 07/09/2017    LABIL2 1.1 07/09/2017    AST 35 07/09/2017    ALT 41 07/09/2017     No results found for: TSH  Lab Results   Component Value Date    INR 1.18 07/08/2017    PROTIME 15.0 07/08/2017     Lab Results   Component Value Date    PTT 79.7 (H) 07/09/2017       EKG:     Telemetry:    Ejection Fraction  No results found for: EF    Echo EF Estimated  Lab Results   Component Value Date    ECHOEFEST 36 07/08/2017         Present on Admission:  • Atrial fibrillation    Assessment/Plan   #1 dyspepsia with a nauseous feeling #2 atrial fibrillation persistent #3 and will dysfunction ejection fraction approximately 35% #4 hypertension    Clinically there is no sign of any cardiac decompensation based the clinical history physical finding.  Patient rested comfortably in the bed except some nauseous feeling.  CT scan of dominant pathology reported.  I will give Nexium 40 mg today and tomorrow and discontinue IV heparin and Cardizem and start the patient on eliquis 2.5 mg to decrease risk of cardiac embolic phenomena given the Paul vas scoring system.  I would also add low-dose ARB and dig.    Lexx Byers MD  07/09/17  11:43 AM      EMR Dragon/Transcription disclaimer:   Much of this encounter note is an electronic transcription/translation of spoken language to printed text. The electronic translation of spoken language may permit erroneous, or at times, nonsensical words or phrases to be inadvertently transcribed; Although I have reviewed the note for such  errors, some may still exist.

## 2017-07-09 NOTE — PLAN OF CARE
Problem: Patient Care Overview (Adult)  Goal: Plan of Care Review  Outcome: Ongoing (interventions implemented as appropriate)    07/08/17 2200   Coping/Psychosocial Response Interventions   Plan Of Care Reviewed With patient;family       Goal: Adult Individualization and Mutuality  Outcome: Ongoing (interventions implemented as appropriate)  Goal: Discharge Needs Assessment  Outcome: Ongoing (interventions implemented as appropriate)    Problem: Acute Coronary Syndrome (ACS) (Adult)  Goal: Signs and Symptoms of Listed Potential Problems Will be Absent or Manageable (Acute Coronary Syndrome)  Outcome: Ongoing (interventions implemented as appropriate)

## 2017-07-10 LAB
APTT PPP: 35.9 SECONDS (ref 20–40.3)
WHOLE BLOOD HOLD SPECIMEN: NORMAL

## 2017-07-10 PROCEDURE — 25010000002 ONDANSETRON PER 1 MG: Performed by: HOSPITALIST

## 2017-07-10 PROCEDURE — 85730 THROMBOPLASTIN TIME PARTIAL: CPT | Performed by: HOSPITALIST

## 2017-07-10 PROCEDURE — 99232 SBSQ HOSP IP/OBS MODERATE 35: CPT | Performed by: INTERNAL MEDICINE

## 2017-07-10 RX ORDER — SODIUM CHLORIDE 9 MG/ML
75 INJECTION, SOLUTION INTRAVENOUS CONTINUOUS
Status: DISCONTINUED | OUTPATIENT
Start: 2017-07-10 | End: 2017-07-11

## 2017-07-10 RX ORDER — PANTOPRAZOLE SODIUM 40 MG/1
40 TABLET, DELAYED RELEASE ORAL EVERY MORNING
Status: DISCONTINUED | OUTPATIENT
Start: 2017-07-10 | End: 2017-07-12 | Stop reason: HOSPADM

## 2017-07-10 RX ADMIN — APIXABAN 2.5 MG: 2.5 TABLET, FILM COATED ORAL at 07:25

## 2017-07-10 RX ADMIN — ONDANSETRON 4 MG: 2 INJECTION INTRAMUSCULAR; INTRAVENOUS at 08:30

## 2017-07-10 RX ADMIN — PANTOPRAZOLE SODIUM 40 MG: 40 INJECTION, POWDER, FOR SOLUTION INTRAVENOUS at 05:34

## 2017-07-10 RX ADMIN — LOSARTAN POTASSIUM 25 MG: 25 TABLET ORAL at 07:25

## 2017-07-10 RX ADMIN — TAMSULOSIN HYDROCHLORIDE 0.4 MG: 0.4 CAPSULE ORAL at 20:00

## 2017-07-10 RX ADMIN — ASPIRIN 81 MG: 81 TABLET, COATED ORAL at 07:24

## 2017-07-10 RX ADMIN — APIXABAN 2.5 MG: 2.5 TABLET, FILM COATED ORAL at 20:00

## 2017-07-10 RX ADMIN — ISOSORBIDE MONONITRATE 30 MG: 30 TABLET, EXTENDED RELEASE ORAL at 07:25

## 2017-07-10 RX ADMIN — POLYETHYLENE GLYCOL 3350 17 G: 17 POWDER, FOR SOLUTION ORAL at 06:32

## 2017-07-10 RX ADMIN — METOPROLOL TARTRATE 25 MG: 25 TABLET ORAL at 20:00

## 2017-07-10 RX ADMIN — Medication 10 ML: at 08:30

## 2017-07-10 RX ADMIN — METOPROLOL TARTRATE 25 MG: 25 TABLET ORAL at 07:24

## 2017-07-10 RX ADMIN — ONDANSETRON 4 MG: 2 INJECTION INTRAMUSCULAR; INTRAVENOUS at 19:56

## 2017-07-10 RX ADMIN — SODIUM CHLORIDE 75 ML/HR: 9 INJECTION, SOLUTION INTRAVENOUS at 13:03

## 2017-07-10 RX ADMIN — ATORVASTATIN CALCIUM 10 MG: 10 TABLET, FILM COATED ORAL at 20:00

## 2017-07-10 RX ADMIN — DIGOXIN 125 MCG: 125 TABLET ORAL at 13:05

## 2017-07-10 NOTE — PROGRESS NOTES
LOS: 2 days   Patient Care Team:  Jose R Sanchez MD as PCP - General (Internal Medicine)    Chief Complaint:  Dyspeptic symtom and nauseous improved after IV Nexium and spontaneously converted to sinus rhythm     89 years old patient with history of hypertension noted to be abnormal troponin and atrial fibrillation with rapid ventricular response.  Patient started on appropriate medical management and spontaneously converted to sinus rhythm.  Yesterday he complained significant nauseous feeling and dyspeptic symptoms.  Given IV Nexium with a significant improvement in his was received another dose of IV Nexium today.  Review of Systems:   ROS    Objective     Current Facility-Administered Medications   Medication Dose Route Frequency Provider Last Rate Last Dose   • apixaban (ELIQUIS) tablet 2.5 mg  2.5 mg Oral Q12H Lexx Byers MD   2.5 mg at 07/10/17 0725   • aspirin EC tablet 81 mg  81 mg Oral Daily Lexx Byers MD   81 mg at 07/10/17 0724   • atorvastatin (LIPITOR) tablet 10 mg  10 mg Oral Nightly Vinod Santiago MD   10 mg at 07/09/17 2150   • digoxin (LANOXIN) tablet 125 mcg  125 mcg Oral Daily Lexx Byers MD   125 mcg at 07/09/17 1345   • isosorbide mononitrate (IMDUR) 24 hr tablet 30 mg  30 mg Oral Q24H Lexx Byers MD   30 mg at 07/10/17 0725   • losartan (COZAAR) tablet 25 mg  25 mg Oral Q24H Lexx Byers MD   25 mg at 07/10/17 0725   • metoprolol tartrate (LOPRESSOR) tablet 25 mg  25 mg Oral Q12H Lexx Byers MD   25 mg at 07/10/17 0724   • Morphine sulfate (PF) injection 1 mg  1 mg Intravenous Q4H PRN Vinod Santiago MD        And   • naloxone (NARCAN) injection 0.4 mg  0.4 mg Intravenous Q5 Min PRN Vinod Santiago MD       • ondansetron (ZOFRAN) tablet 4 mg  4 mg Oral Q6H PRN Vinod Santiago MD        Or   • ondansetron ODT (ZOFRAN-ODT) disintegrating tablet 4 mg  4 mg Oral Q6H PRN Vinod Santiago MD   4 mg at 07/09/17 1128    Or   • ondansetron (ZOFRAN) injection 4  "mg  4 mg Intravenous Q6H PRN Vinod Santiago MD   4 mg at 07/10/17 0830   • polyethylene glycol (MIRALAX) powder 17 g  17 g Oral Daily PRN Vinod Santiago MD   17 g at 07/10/17 0632   • sodium chloride 0.9 % flush 1-10 mL  1-10 mL Intravenous PRN Vinod Santiago MD   10 mL at 07/10/17 0830   • tamsulosin (FLOMAX) 24 hr capsule 0.4 mg  0.4 mg Oral Nightly Vinod Santiago MD   0.4 mg at 07/09/17 2150       Vital Sign Min/Max for last 24 hours  Temp  Min: 97.3 °F (36.3 °C)  Max: 98.2 °F (36.8 °C)   BP  Min: 96/52  Max: 130/73   Pulse  Min: 65  Max: 97   Resp  Min: 18  Max: 20   SpO2  Min: 90 %  Max: 95 %   Flow (L/min)  Min: 2  Max: 2   Weight  Min: 164 lb 6.4 oz (74.6 kg)  Max: 164 lb 6.4 oz (74.6 kg)     Flowsheet Rows         First Filed Value    Admission Height  70\" (177.8 cm) Documented at 07/08/2017 0439    Admission Weight  165 lb 1.6 oz (74.9 kg) Documented at 07/08/2017 0439            Intake/Output Summary (Last 24 hours) at 07/10/17 0915  Last data filed at 07/10/17 0500   Gross per 24 hour   Intake             1520 ml   Output              650 ml   Net              870 ml       Physical Exam:     General Appearance:    Alert, cooperative, in no acute distress   Lungs:     Clear to auscultation,respirations regular, even and                  unlabored    Heart:    Regular rhythm and normal rate, normal S1 and S2, no            murmur, no gallop, no rub, no click   Chest Wall:    No abnormalities observed   Abdomen:     Normal bowel sounds, no masses, no organomegaly, soft        non-tender, non-distended, no guarding, no rebound                tenderness   Extremities:   Moves all extremities well, no edema, no cyanosis, no             redness   Pulses:   Pulses palpable and equal bilaterally   Skin:   No bleeding, bruising or rash        Results Review:   I reviewed the patient's new clinical results.  Lab Results   Component Value Date    WBC 6.09 07/09/2017    HGB 10.9 (L) 07/09/2017    HCT 31.9 (L) " 07/09/2017    MCV 97.9 07/09/2017     07/09/2017     Lab Results   Component Value Date    GLUCOSE 100 07/09/2017    BUN 17 07/09/2017    CREATININE 0.98 07/09/2017    EGFRIFAFRI 87 07/09/2017    BCR 17.3 07/09/2017    CO2 25.0 07/09/2017    CALCIUM 8.2 (L) 07/09/2017    ALBUMIN 3.30 (L) 07/09/2017    LABIL2 1.1 07/09/2017    AST 35 07/09/2017    ALT 41 07/09/2017     No results found for: TSH  Lab Results   Component Value Date    INR 1.18 07/08/2017    PROTIME 15.0 07/08/2017     Lab Results   Component Value Date    PTT 35.9 07/10/2017       EKG:     Telemetry:    Ejection Fraction  No results found for: EF    Echo EF Estimated  Lab Results   Component Value Date    ECHOEFEST 36 07/08/2017         Present on Admission:  • Atrial fibrillation    Assessment/Plan   1 dyspepsia with a nauseous feeling #2 atrial fibrillation, paroxysmal  #3 and will dysfunction ejection fraction approximately 35% #4 hypertension    89 years old patient appropriately his age still good mental status who received IV  Yesterday with a significant improvement in nauseous and unable to get 1 dose today.  I will place and start oral Nexium 40 mg.  We'll continue with dig, metoprolol 25 mg twice a day, Paxil) to decrease risk of cardiac embolic phenomena and losartan.  Recommend GI evaluations if there is no improvement in dyspeptic symptoms.       Lexx Byers MD  07/10/17  9:15 AM      EMR Dragon/Transcription disclaimer:   Much of this encounter note is an electronic transcription/translation of spoken language to printed text. The electronic translation of spoken language may permit erroneous, or at times, nonsensical words or phrases to be inadvertently transcribed; Although I have reviewed the note for such errors, some may still exist.

## 2017-07-10 NOTE — PROGRESS NOTES
[facility] Medicine Services  INPATIENT PROGRESS NOTE     LOS: 2 days   Patient Care Team:  Jose R Sanchez MD as PCP - General (Internal Medicine)    Chief Complaint:  <principal problem not specified>      Subjective     Interval History:     Patient Complaints: Patient seen and examined.  He is periodically nauseated but denies vomiting.  He has since been started on Protonix and has ongoing when necessary anti-emetics.  No chest pain or shortness of breath and he has since converted to normal sinus rhythm.    History taken from: Patient.    Review of Systems:    Review of Systems   Constitutional: Negative for appetite change, chills, diaphoresis and fever.   HENT: Negative for congestion, rhinorrhea, sore throat and trouble swallowing.    Eyes: Negative for visual disturbance.   Respiratory: Negative for cough, chest tightness, shortness of breath and wheezing.    Cardiovascular: Negative for chest pain, palpitations and leg swelling.   Gastrointestinal: Positive for nausea. Negative for abdominal pain, blood in stool, diarrhea and vomiting.   Endocrine: Negative for cold intolerance, heat intolerance, polydipsia, polyphagia and polyuria.   Genitourinary: Negative for decreased urine volume, difficulty urinating, dysuria, enuresis, flank pain, frequency, hematuria and urgency.   Musculoskeletal: Negative for back pain, gait problem and neck pain.   Skin: Negative for rash.   Neurological: Negative for dizziness, syncope, speech difficulty, weakness, light-headedness, numbness and headaches.   Psychiatric/Behavioral: Negative for agitation, behavioral problems and confusion. The patient is not nervous/anxious.          Objective     Vital Signs  Temp:  [97.3 °F (36.3 °C)-98.2 °F (36.8 °C)] 98 °F (36.7 °C)  Heart Rate:  [65-97] 84  Resp:  [18-21] 21  BP: (103-130)/(61-73) 123/66    Physical Exam:   Physical Exam   Constitutional: He is oriented to person,  place, and time. He appears well-developed and well-nourished.   HENT:   Head: Normocephalic and atraumatic.   Nose: Nose normal.   Eyes: Conjunctivae and EOM are normal. Pupils are equal, round, and reactive to light.   Neck: Normal range of motion. Neck supple. No JVD present. No tracheal deviation present. No thyromegaly present.   Cardiovascular: Regular rhythm, normal heart sounds and intact distal pulses.  Exam reveals no gallop.    No murmur heard.  Pulmonary/Chest: Effort normal. No respiratory distress. He has no wheezes. He has no rales. He exhibits no tenderness.   Abdominal: Soft. Bowel sounds are normal. He exhibits no distension. There is no tenderness. There is no rebound and no guarding.   Musculoskeletal: Normal range of motion. He exhibits no edema.   Lymphadenopathy:     He has no cervical adenopathy.   Neurological: He is alert and oriented to person, place, and time. He has normal reflexes. No cranial nerve deficit.   Skin: Skin is warm and dry.   Intact   Psychiatric: He has a normal mood and affect.   Nursing note and vitals reviewed.         Results Review:         Results from last 7 days  Lab Units 07/09/17  0827 07/08/17  0558   SODIUM mmol/L 128* 134*   POTASSIUM mmol/L 4.4 4.7   CHLORIDE mmol/L 95 98   CO2 mmol/L 25.0 27.0   BUN mg/dL 17 22*   CREATININE mg/dL 0.98 1.01   GLUCOSE mg/dL 100 116*   CALCIUM mg/dL 8.2* 8.1*   BILIRUBIN mg/dL 0.9 0.6   ALK PHOS U/L 83 76   ALT (SGPT) U/L 41 47   AST (SGOT) U/L 35 44               Results from last 7 days  Lab Units 07/09/17  0827 07/08/17  0558   WBC 10*3/mm3 6.09 5.88   HEMOGLOBIN g/dL 10.9* 11.2*   HEMATOCRIT % 31.9* 33.5*   PLATELETS 10*3/mm3 164 173       Lab Results   Component Value Date    CKTOTAL 87 07/08/2017    CKMB 2.35 07/08/2017    TROPONINI 0.176 (C) 07/08/2017       CO2   Date Value Ref Range Status   07/09/2017 25.0 22.0 - 31.0 mmol/L Final         Results from last 7 days  Lab Units 07/08/17  0558   INR  1.18        Imaging  Results (last 7 days)     Procedure Component Value Units Date/Time    CT Abdomen Pelvis Without Contrast [[id number]] Collected:  07/08/17 0903     Updated:  07/08/17 0931    Narrative:       CT abdomen, pelvis without contrast.       CLINICAL INDICATION: Abdominal pain.    Nausea.    COMPARISON: None       TECHNIQUE: Noncontrast helical scanning with axial and coronal  reformations. Soft tissue, lung, and bone windows reviewed.    This exam was performed according to our departmental  dose-optimization program, which includes automated exposure  control, adjustment of the mA and/or kV according to patient size  and/or use of iterative reconstruction technique.    ABDOMEN CT FINDINGS: The visualized lung bases show minor  dependent changes. Scan sensitivity for abnormalities of the  organs is limited by the lack of IV contrast. Within this  limitation the following observations are made.    Cardiomegaly. Small bilateral pleural effusions, larger on the  right than left. Infiltrative changes right middle lobe and right  lower lobe either pneumonitis or atelectasis.    Normal liver. Normal spleen. Normal pancreas. The gallbladder  surgically absent. Subtle calcifications in the renal parenchyma,  nephrocalcinosis. The kidneys are otherwise unremarkable. No  masses or evidence of obstruction.    Densely calcified but normal caliber abdominal aorta. No  retroperitoneal adenopathy. Diffuse increased stool throughout  the colon. The bowel is otherwise unremarkable. Normal appendix.  Degenerative changes lumbar spine. Mild arthritic changes of both  hips. No pelvic masses.          Impression:       CONCLUSION: Cardiomegaly. Small bilateral pleural effusions  larger on the right than on the left. Infiltrative changes right  middle lobe and right lower lobe either pneumonitis or  atelectasis.    Subtle calcifications within the renal parenchyma,  nephrocalcinosis. Kidneys otherwise unremarkable. Degenerative  changes lumbar  spine. Evidence of prior cholecystectomy. CT  abdomen, pelvis without contrast is otherwise unremarkable.    Electronically signed by:  Jose King MD  7/8/2017 9:30 AM CDT  Workstation: [id number]                                    Medication Review:   Current Facility-Administered Medications   Medication Dose Route Frequency Provider Last Rate Last Dose   • apixaban (ELIQUIS) tablet 2.5 mg  2.5 mg Oral Q12H Lexx Byers MD   2.5 mg at 07/10/17 0725   • aspirin EC tablet 81 mg  81 mg Oral Daily Lexx Byers MD   81 mg at 07/10/17 0724   • atorvastatin (LIPITOR) tablet 10 mg  10 mg Oral Nightly Vinod Santiago MD   10 mg at 07/09/17 2150   • digoxin (LANOXIN) tablet 125 mcg  125 mcg Oral Daily Lexx Byers MD   125 mcg at 07/09/17 1345   • isosorbide mononitrate (IMDUR) 24 hr tablet 30 mg  30 mg Oral Q24H Lexx Byers MD   30 mg at 07/10/17 0725   • losartan (COZAAR) tablet 25 mg  25 mg Oral Q24H Lexx Byers MD   25 mg at 07/10/17 0725   • metoprolol tartrate (LOPRESSOR) tablet 25 mg  25 mg Oral Q12H Lexx Byers MD   25 mg at 07/10/17 0724   • Morphine sulfate (PF) injection 1 mg  1 mg Intravenous Q4H PRN Vinod Santiago MD        And   • naloxone (NARCAN) injection 0.4 mg  0.4 mg Intravenous Q5 Min PRN Vinod Santiago MD       • ondansetron (ZOFRAN) tablet 4 mg  4 mg Oral Q6H PRN Vinod Santiago MD        Or   • ondansetron ODT (ZOFRAN-ODT) disintegrating tablet 4 mg  4 mg Oral Q6H PRN Vinod Santiago MD   4 mg at 07/09/17 1128    Or   • ondansetron (ZOFRAN) injection 4 mg  4 mg Intravenous Q6H PRN Vinod Santiago MD   4 mg at 07/10/17 0830   • pantoprazole (PROTONIX) EC tablet 40 mg  40 mg Oral QAM Lexx Byers MD       • polyethylene glycol (MIRALAX) powder 17 g  17 g Oral Daily PRN Vinod Santiago MD   17 g at 07/10/17 0632   • sodium chloride 0.9 % flush 1-10 mL  1-10 mL Intravenous PRN Vinod Santiago MD   10 mL at 07/10/17 0830   • sodium chloride 0.9 % infusion  75 mL/hr  Intravenous Continuous Shalom Butcher MD 75 mL/hr at 07/10/17 1303 75 mL/hr at 07/10/17 1303   • tamsulosin (FLOMAX) 24 hr capsule 0.4 mg  0.4 mg Oral Nightly Brock Eliezer Santiago MD   0.4 mg at 07/09/17 2150         Assessment/Plan   1.  Atrial fibrillation with rapid ventricular response: Continue current rate control medications and apixaban.  Input by the cardiologist is appreciated.  2.  Hyponatremia: Begin low infusion rate of normal saline, free water restriction and recheck sodium level in a.m.  3.  History of this dyspepsia: Continue Protonix and anti-emetics.  May need outpatient GI workup if symptoms persist.  4.  Likely discharge in a.m.        This document has been electronically signed by Shalom Butcher MD on July 10, 2017 1:04 PM        EMR Dragon/Transcription disclaimer:   Much of this encounter note is an electronic transcription/translation of spoken language to printed text. The electronic translation of spoken language may permit erroneous, or at times, nonsensical words or phrases to be inadvertently transcribed; Although I have reviewed the note for such errors, some may still exist.

## 2017-07-10 NOTE — PLAN OF CARE
Problem: Patient Care Overview (Adult)  Goal: Plan of Care Review  Outcome: Ongoing (interventions implemented as appropriate)  Goal: Adult Individualization and Mutuality  Outcome: Ongoing (interventions implemented as appropriate)    07/10/17 1220   Individualization   Patient Specific Preferences patient drinks warm prune juice to help stimulate bowel movement          Problem: Acute Coronary Syndrome (ACS) (Adult)  Goal: Signs and Symptoms of Listed Potential Problems Will be Absent or Manageable (Acute Coronary Syndrome)  Outcome: Ongoing (interventions implemented as appropriate)

## 2017-07-11 ENCOUNTER — APPOINTMENT (OUTPATIENT)
Dept: GENERAL RADIOLOGY | Facility: HOSPITAL | Age: 82
End: 2017-07-11

## 2017-07-11 LAB
ANION GAP SERPL CALCULATED.3IONS-SCNC: 9 MMOL/L (ref 5–15)
BUN BLD-MCNC: 15 MG/DL (ref 7–21)
BUN/CREAT SERPL: 15 (ref 7–25)
CALCIUM SPEC-SCNC: 8.2 MG/DL (ref 8.4–10.2)
CHLORIDE SERPL-SCNC: 97 MMOL/L (ref 95–110)
CO2 SERPL-SCNC: 25 MMOL/L (ref 22–31)
CREAT BLD-MCNC: 1 MG/DL (ref 0.7–1.3)
GFR SERPL CREATININE-BSD FRML MDRD: 85 ML/MIN/1.73 (ref 42–98)
GLUCOSE BLD-MCNC: 101 MG/DL (ref 60–100)
POTASSIUM BLD-SCNC: 4 MMOL/L (ref 3.5–5.1)
SODIUM BLD-SCNC: 131 MMOL/L (ref 137–145)

## 2017-07-11 PROCEDURE — 99232 SBSQ HOSP IP/OBS MODERATE 35: CPT | Performed by: INTERNAL MEDICINE

## 2017-07-11 PROCEDURE — 25010000002 FUROSEMIDE PER 20 MG: Performed by: INTERNAL MEDICINE

## 2017-07-11 PROCEDURE — 71020 HC CHEST PA AND LATERAL: CPT

## 2017-07-11 PROCEDURE — 80048 BASIC METABOLIC PNL TOTAL CA: CPT | Performed by: INTERNAL MEDICINE

## 2017-07-11 RX ORDER — FUROSEMIDE 10 MG/ML
40 INJECTION INTRAMUSCULAR; INTRAVENOUS EVERY 12 HOURS
Status: DISCONTINUED | OUTPATIENT
Start: 2017-07-11 | End: 2017-07-12 | Stop reason: HOSPADM

## 2017-07-11 RX ORDER — MECLIZINE HYDROCHLORIDE 25 MG/1
25 TABLET ORAL 2 TIMES DAILY PRN
Status: DISCONTINUED | OUTPATIENT
Start: 2017-07-11 | End: 2017-07-12 | Stop reason: HOSPADM

## 2017-07-11 RX ORDER — METOPROLOL SUCCINATE 50 MG/1
50 TABLET, EXTENDED RELEASE ORAL
Status: DISCONTINUED | OUTPATIENT
Start: 2017-07-11 | End: 2017-07-12 | Stop reason: HOSPADM

## 2017-07-11 RX ADMIN — TAMSULOSIN HYDROCHLORIDE 0.4 MG: 0.4 CAPSULE ORAL at 20:28

## 2017-07-11 RX ADMIN — APIXABAN 2.5 MG: 2.5 TABLET, FILM COATED ORAL at 20:27

## 2017-07-11 RX ADMIN — ONDANSETRON 4 MG: 4 TABLET, FILM COATED ORAL at 06:13

## 2017-07-11 RX ADMIN — APIXABAN 2.5 MG: 2.5 TABLET, FILM COATED ORAL at 10:17

## 2017-07-11 RX ADMIN — ATORVASTATIN CALCIUM 10 MG: 10 TABLET, FILM COATED ORAL at 20:28

## 2017-07-11 RX ADMIN — METOPROLOL SUCCINATE 50 MG: 50 TABLET, EXTENDED RELEASE ORAL at 10:17

## 2017-07-11 RX ADMIN — LOSARTAN POTASSIUM 25 MG: 25 TABLET ORAL at 10:19

## 2017-07-11 RX ADMIN — MECLIZINE HYDROCHLORIDE 25 MG: 25 TABLET ORAL at 21:39

## 2017-07-11 RX ADMIN — ASPIRIN 81 MG: 81 TABLET, COATED ORAL at 10:16

## 2017-07-11 RX ADMIN — MECLIZINE HYDROCHLORIDE 25 MG: 25 TABLET ORAL at 12:07

## 2017-07-11 RX ADMIN — FUROSEMIDE 40 MG: 10 INJECTION, SOLUTION INTRAMUSCULAR; INTRAVENOUS at 17:42

## 2017-07-11 RX ADMIN — DIGOXIN 125 MCG: 125 TABLET ORAL at 12:07

## 2017-07-11 RX ADMIN — ISOSORBIDE MONONITRATE 30 MG: 30 TABLET, EXTENDED RELEASE ORAL at 10:17

## 2017-07-11 RX ADMIN — PANTOPRAZOLE SODIUM 40 MG: 40 TABLET, DELAYED RELEASE ORAL at 06:13

## 2017-07-11 NOTE — PROGRESS NOTES
[facility] Medicine Services  INPATIENT PROGRESS NOTE     LOS: 3 days   Patient Care Team:  Jose R Sanchez MD as PCP - General (Internal Medicine)    Chief Complaint:  <principal problem not specified>      Subjective     Interval History:     Patient Complaints: Patient seen and examined.  He continues to have periodic vertigo with nausea.  He denies tinnitus but wears hearing aids.  The vertigo has been ongoing for months and each time associated with nausea.   No chest pain or shortness of breath but patient remains on supplemental oxygen of 2 L nasal prongs with saturation in the upper 80s.  He continues to remain in normal sinus rhythm.      History taken from: Patient.    Review of Systems:    Review of Systems   Constitutional: Negative for appetite change, chills, diaphoresis and fever.   HENT: Negative for congestion, rhinorrhea, sore throat and trouble swallowing.    Eyes: Negative for visual disturbance.   Respiratory: Negative for cough, chest tightness, shortness of breath and wheezing.    Cardiovascular: Negative for chest pain, palpitations and leg swelling.   Gastrointestinal: Positive for nausea. Negative for abdominal pain, blood in stool, diarrhea and vomiting.   Endocrine: Negative for cold intolerance, heat intolerance, polydipsia, polyphagia and polyuria.   Genitourinary: Negative for decreased urine volume, difficulty urinating, dysuria, enuresis, flank pain, frequency, hematuria and urgency.   Musculoskeletal: Negative for back pain, gait problem and neck pain.   Skin: Negative for rash.   Neurological: Positive for light-headedness. Negative for dizziness, syncope, speech difficulty, weakness, numbness and headaches.   Psychiatric/Behavioral: Negative for agitation, behavioral problems and confusion. The patient is not nervous/anxious.          Objective     Vital Signs  Temp:  [97.9 °F (36.6 °C)-98.5 °F (36.9 °C)] 98.5 °F (36.9  °C)  Heart Rate:  [] 78  Resp:  [18-22] 18  BP: (118-168)/(67-78) 118/68    Physical Exam:   Physical Exam   Constitutional: He is oriented to person, place, and time. He appears well-developed and well-nourished.   HENT:   Head: Normocephalic and atraumatic.   Nose: Nose normal.   Eyes: Conjunctivae and EOM are normal. Pupils are equal, round, and reactive to light.   Neck: Normal range of motion. Neck supple. No JVD present. No tracheal deviation present. No thyromegaly present.   Cardiovascular: Regular rhythm, normal heart sounds and intact distal pulses.  Exam reveals no gallop.    No murmur heard.  Pulmonary/Chest: Effort normal. No respiratory distress. He has decreased breath sounds in the right lower field and the left lower field. He has no wheezes. He has rales. He exhibits no tenderness.   Abdominal: Soft. Bowel sounds are normal. He exhibits no distension. There is no tenderness. There is no rebound and no guarding.   Musculoskeletal: Normal range of motion. He exhibits no edema.   Lymphadenopathy:     He has no cervical adenopathy.   Neurological: He is alert and oriented to person, place, and time. He has normal reflexes. No cranial nerve deficit.   Skin: Skin is warm and dry.   Intact   Psychiatric: He has a normal mood and affect.   Nursing note and vitals reviewed.         Results Review:         Results from last 7 days  Lab Units 07/11/17  0707 07/09/17  0827 07/08/17  0558   SODIUM mmol/L 131* 128* 134*   POTASSIUM mmol/L 4.0 4.4 4.7   CHLORIDE mmol/L 97 95 98   CO2 mmol/L 25.0 25.0 27.0   BUN mg/dL 15 17 22*   CREATININE mg/dL 1.00 0.98 1.01   GLUCOSE mg/dL 101* 100 116*   CALCIUM mg/dL 8.2* 8.2* 8.1*   BILIRUBIN mg/dL  --  0.9 0.6   ALK PHOS U/L  --  83 76   ALT (SGPT) U/L  --  41 47   AST (SGOT) U/L  --  35 44               Results from last 7 days  Lab Units 07/09/17  0827 07/08/17  0558   WBC 10*3/mm3 6.09 5.88   HEMOGLOBIN g/dL 10.9* 11.2*   HEMATOCRIT % 31.9* 33.5*   PLATELETS  10*3/mm3 164 173       Lab Results   Component Value Date    CKTOTAL 87 07/08/2017    CKMB 2.35 07/08/2017    TROPONINI 0.176 (C) 07/08/2017       CO2   Date Value Ref Range Status   07/11/2017 25.0 22.0 - 31.0 mmol/L Final         Results from last 7 days  Lab Units 07/08/17  0558   INR  1.18        Imaging Results (last 7 days)     Procedure Component Value Units Date/Time    CT Abdomen Pelvis Without Contrast [[id number]] Collected:  07/08/17 0903     Updated:  07/08/17 0931    Narrative:       CT abdomen, pelvis without contrast.       CLINICAL INDICATION: Abdominal pain.    Nausea.    COMPARISON: None       TECHNIQUE: Noncontrast helical scanning with axial and coronal  reformations. Soft tissue, lung, and bone windows reviewed.    This exam was performed according to our departmental  dose-optimization program, which includes automated exposure  control, adjustment of the mA and/or kV according to patient size  and/or use of iterative reconstruction technique.    ABDOMEN CT FINDINGS: The visualized lung bases show minor  dependent changes. Scan sensitivity for abnormalities of the  organs is limited by the lack of IV contrast. Within this  limitation the following observations are made.    Cardiomegaly. Small bilateral pleural effusions, larger on the  right than left. Infiltrative changes right middle lobe and right  lower lobe either pneumonitis or atelectasis.    Normal liver. Normal spleen. Normal pancreas. The gallbladder  surgically absent. Subtle calcifications in the renal parenchyma,  nephrocalcinosis. The kidneys are otherwise unremarkable. No  masses or evidence of obstruction.    Densely calcified but normal caliber abdominal aorta. No  retroperitoneal adenopathy. Diffuse increased stool throughout  the colon. The bowel is otherwise unremarkable. Normal appendix.  Degenerative changes lumbar spine. Mild arthritic changes of both  hips. No pelvic masses.          Impression:       CONCLUSION:  Cardiomegaly. Small bilateral pleural effusions  larger on the right than on the left. Infiltrative changes right  middle lobe and right lower lobe either pneumonitis or  atelectasis.    Subtle calcifications within the renal parenchyma,  nephrocalcinosis. Kidneys otherwise unremarkable. Degenerative  changes lumbar spine. Evidence of prior cholecystectomy. CT  abdomen, pelvis without contrast is otherwise unremarkable.    Electronically signed by:  Jose King MD  7/8/2017 9:30 AM CDT  Workstation: [id number]                                    Medication Review:   Current Facility-Administered Medications   Medication Dose Route Frequency Provider Last Rate Last Dose   • apixaban (ELIQUIS) tablet 2.5 mg  2.5 mg Oral Q12H Lexx Byers MD   2.5 mg at 07/11/17 1017   • aspirin EC tablet 81 mg  81 mg Oral Daily Lexx Byers MD   81 mg at 07/11/17 1016   • atorvastatin (LIPITOR) tablet 10 mg  10 mg Oral Nightly Vinod Santiago MD   10 mg at 07/10/17 2000   • digoxin (LANOXIN) tablet 125 mcg  125 mcg Oral Daily Lexx Byers MD   125 mcg at 07/11/17 1207   • isosorbide mononitrate (IMDUR) 24 hr tablet 30 mg  30 mg Oral Q24H Lexx Byers MD   30 mg at 07/11/17 1017   • losartan (COZAAR) tablet 25 mg  25 mg Oral Q24H Lexx Byesr MD   25 mg at 07/11/17 1019   • meclizine (ANTIVERT) tablet 25 mg  25 mg Oral BID PRN Shalom Butcher MD   25 mg at 07/11/17 1207   • metoprolol succinate XL (TOPROL-XL) 24 hr tablet 50 mg  50 mg Oral Q24H Lexx Byers MD   50 mg at 07/11/17 1017   • Morphine sulfate (PF) injection 1 mg  1 mg Intravenous Q4H PRN Vinod Santiago MD        And   • naloxone (NARCAN) injection 0.4 mg  0.4 mg Intravenous Q5 Min PRN Vinod Santiago MD       • ondansetron (ZOFRAN) tablet 4 mg  4 mg Oral Q6H PRN Vinod Santiago MD   4 mg at 07/11/17 0613    Or   • ondansetron ODT (ZOFRAN-ODT) disintegrating tablet 4 mg  4 mg Oral Q6H PRN Vinod Santiago MD   4 mg at 07/09/17 1128    Or   •  ondansetron (ZOFRAN) injection 4 mg  4 mg Intravenous Q6H PRN Vinod Santiago MD   4 mg at 07/10/17 1956   • pantoprazole (PROTONIX) EC tablet 40 mg  40 mg Oral JOSE Byers MD   40 mg at 07/11/17 0613   • polyethylene glycol (MIRALAX) powder 17 g  17 g Oral Daily PRN Vinod Santiago MD   17 g at 07/10/17 0632   • sodium chloride 0.9 % flush 1-10 mL  1-10 mL Intravenous PRN Vinod Santiago MD   10 mL at 07/10/17 0830   • tamsulosin (FLOMAX) 24 hr capsule 0.4 mg  0.4 mg Oral Nightly Vinod Santiago MD   0.4 mg at 07/10/17 2000         Assessment/Plan   1.  Atrial fibrillation with rapid ventricular response: Patient has continued to remain in normal sinus rhythm.  Continue current rate control medications and apixaban.  Input by the cardiologist is appreciated.  2.  Hyponatremia: Much improved.  3.  History of chronic vertigo to rule out labyrinthitis: We'll begin empiric trial of meclizine when necessary.  4.  History of this dyspepsia: Continue Protonix and anti-emetics.  May need outpatient GI workup if symptoms persist.  5.  Bilateral pleural effusion: This is documented on repeat chest x-ray done today as well as the initial CT scan of the abdomen and chest.  He has had an echocardiogram that showed moderately decreased left ventricular systolic function with ejection fraction of 36% and severely dilated left atrial cavity.  We'll begin diuretic therapy and continue beta blocker and ARB.  6.  Likely discharge in 1 to 2 days.        This document has been electronically signed by Shalom Butcher MD on July 11, 2017 2:25 PM        EMR Dragon/Transcription disclaimer:   Much of this encounter note is an electronic transcription/translation of spoken language to printed text. The electronic translation of spoken language may permit erroneous, or at times, nonsensical words or phrases to be inadvertently transcribed; Although I have reviewed the note for such errors, some may still exist.

## 2017-07-11 NOTE — PLAN OF CARE
Problem: Patient Care Overview (Adult)  Goal: Plan of Care Review  Outcome: Ongoing (interventions implemented as appropriate)    07/11/17 1651   Coping/Psychosocial Response Interventions   Plan Of Care Reviewed With patient   Patient Care Overview   Progress improving   Outcome Evaluation   Outcome Summary/Follow up Plan patient is off O2 again, antivery helped with dizziness/nausea       Goal: Adult Individualization and Mutuality  Outcome: Ongoing (interventions implemented as appropriate)  Goal: Discharge Needs Assessment  Outcome: Ongoing (interventions implemented as appropriate)    Problem: Acute Coronary Syndrome (ACS) (Adult)  Goal: Signs and Symptoms of Listed Potential Problems Will be Absent or Manageable (Acute Coronary Syndrome)  Outcome: Ongoing (interventions implemented as appropriate)

## 2017-07-11 NOTE — PROGRESS NOTES
LOS: 3 days   Patient Care Team:  Jose R Sanchez MD as PCP - General (Internal Medicine)    Chief Complaint:  Remained nauseous in spite of nexium       Review of Systems:   ROS  Constitutional: Negative for appetite change.   HENT: Negative for congestion,   Eyes: Negative for visual disturbance.   Respiratory: Negative for cough, chest tightness,  Cardiovascular: Negative for chest pain, palpitations and leg swelling.   Gastrointestinal: Positive for nausea.   Endocrine: Negative for cold intolerance, heat intolerance   Genitourinary: Negative for decreased urine volume,.   Musculoskeletal: Negative for back pain, gait problem and neck pain.   Skin: Negative for rash.   Neurological: Negative for dizziness, syncope, .   Psychiatric/Behavioral: Negative for agitation  Objective     Current Facility-Administered Medications   Medication Dose Route Frequency Provider Last Rate Last Dose   • apixaban (ELIQUIS) tablet 2.5 mg  2.5 mg Oral Q12H Lexx Byers MD   2.5 mg at 07/10/17 2000   • aspirin EC tablet 81 mg  81 mg Oral Daily Lexx Byers MD   81 mg at 07/10/17 0724   • atorvastatin (LIPITOR) tablet 10 mg  10 mg Oral Nightly Vinod Santiago MD   10 mg at 07/10/17 2000   • digoxin (LANOXIN) tablet 125 mcg  125 mcg Oral Daily Lexx Byers MD   125 mcg at 07/10/17 1305   • isosorbide mononitrate (IMDUR) 24 hr tablet 30 mg  30 mg Oral Q24H Lexx Byers MD   30 mg at 07/10/17 0725   • losartan (COZAAR) tablet 25 mg  25 mg Oral Q24H Lexx Byers MD   25 mg at 07/10/17 0725   • metoprolol tartrate (LOPRESSOR) tablet 25 mg  25 mg Oral Q12H Lexx Byers MD   25 mg at 07/10/17 2000   • Morphine sulfate (PF) injection 1 mg  1 mg Intravenous Q4H PRN Vinod Santiago MD        And   • naloxone (NARCAN) injection 0.4 mg  0.4 mg Intravenous Q5 Min PRN Vinod Santiago MD       • ondansetron (ZOFRAN) tablet 4 mg  4 mg Oral Q6H PRN Vinod Santiago MD   4 mg at 07/11/17 0613    Or   • ondansetron ODT  "(ZOFRAN-ODT) disintegrating tablet 4 mg  4 mg Oral Q6H PRN Vinod Santiago MD   4 mg at 07/09/17 1128    Or   • ondansetron (ZOFRAN) injection 4 mg  4 mg Intravenous Q6H PRN Vinod Santiago MD   4 mg at 07/10/17 1956   • pantoprazole (PROTONIX) EC tablet 40 mg  40 mg Oral JOSE Byers MD   40 mg at 07/11/17 0613   • polyethylene glycol (MIRALAX) powder 17 g  17 g Oral Daily PRN Vinod Santiago MD   17 g at 07/10/17 0632   • sodium chloride 0.9 % flush 1-10 mL  1-10 mL Intravenous PRN Vinod Santiago MD   10 mL at 07/10/17 0830   • sodium chloride 0.9 % infusion  75 mL/hr Intravenous Continuous Shalom Butcher MD 75 mL/hr at 07/10/17 1303 75 mL/hr at 07/10/17 1303   • tamsulosin (FLOMAX) 24 hr capsule 0.4 mg  0.4 mg Oral Nightly Vinod Santiago MD   0.4 mg at 07/10/17 2000       Vital Sign Min/Max for last 24 hours  Temp  Min: 97.9 °F (36.6 °C)  Max: 98.2 °F (36.8 °C)   BP  Min: 123/66  Max: 168/78   Pulse  Min: 81  Max: 107   Resp  Min: 20  Max: 22   SpO2  Min: 88 %  Max: 96 %   Flow (L/min)  Min: 2  Max: 2   Weight  Min: 165 lb 3.2 oz (74.9 kg)  Max: 165 lb 3.2 oz (74.9 kg)     Flowsheet Rows         First Filed Value    Admission Height  70\" (177.8 cm) Documented at 07/08/2017 0439    Admission Weight  165 lb 1.6 oz (74.9 kg) Documented at 07/08/2017 0439          No intake or output data in the 24 hours ending 07/11/17 0944    Physical Exam:     General Appearance:    Alert, cooperative, in no acute distress   Lungs:     Clear to auscultation,respirations regular, even and                  unlabored    Heart:    Regular rhythm and normal rate, normal S1 and S2, no            murmur, no gallop, no rub, no click   Chest Wall:    No abnormalities observed   Abdomen:     Normal bowel sounds, no masses, no organomegaly, soft        non-tender, non-distended, no guarding, no rebound                tenderness   Extremities:   Moves all extremities well, no edema, no cyanosis, no             redness "   Pulses:   Pulses palpable and equal bilaterally   Skin:   No bleeding, bruising or rash        Results Review:   I reviewed the patient's new clinical results.  Lab Results   Component Value Date    WBC 6.09 07/09/2017    HGB 10.9 (L) 07/09/2017    HCT 31.9 (L) 07/09/2017    MCV 97.9 07/09/2017     07/09/2017     Lab Results   Component Value Date    GLUCOSE 101 (H) 07/11/2017    BUN 15 07/11/2017    CREATININE 1.00 07/11/2017    EGFRIFAFRI 85 07/11/2017    BCR 15.0 07/11/2017    CO2 25.0 07/11/2017    CALCIUM 8.2 (L) 07/11/2017    ALBUMIN 3.30 (L) 07/09/2017    LABIL2 1.1 07/09/2017    AST 35 07/09/2017    ALT 41 07/09/2017     No results found for: TSH  Lab Results   Component Value Date    INR 1.18 07/08/2017    PROTIME 15.0 07/08/2017     Lab Results   Component Value Date    PTT 35.9 07/10/2017       EKG:     Telemetry:    Ejection Fraction  No results found for: EF    Echo EF Estimated  Lab Results   Component Value Date    ECHOEFEST 36 07/08/2017         Present on Admission:  • Atrial fibrillation    Assessment/Plan   dyspepsia with a nauseous feeling #2 atrial fibrillation, paroxysmal  #3 and will dysfunction ejection fraction approximately 35% #4 hypertension  Patient remained nauseous in spite of oral Nexium he thinks the problem probably secondary to vertigo had a similar problem in the past.  No chest pain orthopnea reported.  Clinically no sign of any cardiac decompensation.  Spontaneously converted to sinus rhythm will change Lopressor 25 mg twice a day to toprol-XL 50 mg. continue low-dose ARB aspirin and statin.    Lexx Byers MD  07/11/17  9:44 AM      EMR Dragon/Transcription disclaimer:   Much of this encounter note is an electronic transcription/translation of spoken language to printed text. The electronic translation of spoken language may permit erroneous, or at times, nonsensical words or phrases to be inadvertently transcribed; Although I have reviewed the note for such errors,  some may still exist.

## 2017-07-11 NOTE — PLAN OF CARE
Problem: Patient Care Overview (Adult)  Goal: Plan of Care Review  Outcome: Ongoing (interventions implemented as appropriate)    07/11/17 0443   Coping/Psychosocial Response Interventions   Plan Of Care Reviewed With patient   Patient Care Overview   Progress no change       Goal: Adult Individualization and Mutuality  Outcome: Ongoing (interventions implemented as appropriate)  Goal: Discharge Needs Assessment  Outcome: Ongoing (interventions implemented as appropriate)    Problem: Acute Coronary Syndrome (ACS) (Adult)  Goal: Signs and Symptoms of Listed Potential Problems Will be Absent or Manageable (Acute Coronary Syndrome)  Outcome: Ongoing (interventions implemented as appropriate)

## 2017-07-12 VITALS
DIASTOLIC BLOOD PRESSURE: 58 MMHG | TEMPERATURE: 98.2 F | SYSTOLIC BLOOD PRESSURE: 117 MMHG | WEIGHT: 164.1 LBS | OXYGEN SATURATION: 95 % | BODY MASS INDEX: 23.49 KG/M2 | RESPIRATION RATE: 20 BRPM | HEIGHT: 70 IN | HEART RATE: 76 BPM

## 2017-07-12 LAB
ANION GAP SERPL CALCULATED.3IONS-SCNC: 8 MMOL/L (ref 5–15)
BASOPHILS # BLD AUTO: 0.02 10*3/MM3 (ref 0–0.2)
BASOPHILS NFR BLD AUTO: 0.3 % (ref 0–2)
BUN BLD-MCNC: 19 MG/DL (ref 7–21)
BUN/CREAT SERPL: 18.3 (ref 7–25)
CALCIUM SPEC-SCNC: 7.8 MG/DL (ref 8.4–10.2)
CHLORIDE SERPL-SCNC: 99 MMOL/L (ref 95–110)
CO2 SERPL-SCNC: 27 MMOL/L (ref 22–31)
CREAT BLD-MCNC: 1.04 MG/DL (ref 0.7–1.3)
DEPRECATED RDW RBC AUTO: 50.9 FL (ref 35.1–43.9)
EOSINOPHIL # BLD AUTO: 0.23 10*3/MM3 (ref 0–0.7)
EOSINOPHIL NFR BLD AUTO: 3.5 % (ref 0–7)
ERYTHROCYTE [DISTWIDTH] IN BLOOD BY AUTOMATED COUNT: 14.3 % (ref 11.5–14.5)
GFR SERPL CREATININE-BSD FRML MDRD: 81 ML/MIN/1.73 (ref 42–98)
GLUCOSE BLD-MCNC: 87 MG/DL (ref 60–100)
HCT VFR BLD AUTO: 30.6 % (ref 39–49)
HGB BLD-MCNC: 10.6 G/DL (ref 13.7–17.3)
IMM GRANULOCYTES # BLD: 0.02 10*3/MM3 (ref 0–0.02)
IMM GRANULOCYTES NFR BLD: 0.3 % (ref 0–0.5)
LYMPHOCYTES # BLD AUTO: 0.75 10*3/MM3 (ref 0.6–4.2)
LYMPHOCYTES NFR BLD AUTO: 11.6 % (ref 10–50)
MCH RBC QN AUTO: 33.5 PG (ref 26.5–34)
MCHC RBC AUTO-ENTMCNC: 34.6 G/DL (ref 31.5–36.3)
MCV RBC AUTO: 96.8 FL (ref 80–98)
MONOCYTES # BLD AUTO: 0.74 10*3/MM3 (ref 0–0.9)
MONOCYTES NFR BLD AUTO: 11.4 % (ref 0–12)
NEUTROPHILS # BLD AUTO: 4.73 10*3/MM3 (ref 2–8.6)
NEUTROPHILS NFR BLD AUTO: 72.9 % (ref 37–80)
PLATELET # BLD AUTO: 171 10*3/MM3 (ref 150–450)
PMV BLD AUTO: 10.5 FL (ref 8–12)
POTASSIUM BLD-SCNC: 3.8 MMOL/L (ref 3.5–5.1)
RBC # BLD AUTO: 3.16 10*6/MM3 (ref 4.37–5.74)
SODIUM BLD-SCNC: 134 MMOL/L (ref 137–145)
WBC NRBC COR # BLD: 6.49 10*3/MM3 (ref 3.2–9.8)

## 2017-07-12 PROCEDURE — 85025 COMPLETE CBC W/AUTO DIFF WBC: CPT | Performed by: INTERNAL MEDICINE

## 2017-07-12 PROCEDURE — 25010000002 FUROSEMIDE PER 20 MG: Performed by: INTERNAL MEDICINE

## 2017-07-12 PROCEDURE — 80048 BASIC METABOLIC PNL TOTAL CA: CPT | Performed by: INTERNAL MEDICINE

## 2017-07-12 RX ORDER — FUROSEMIDE 40 MG/1
40 TABLET ORAL DAILY
Qty: 30 TABLET | Refills: 1 | Status: SHIPPED | OUTPATIENT
Start: 2017-07-12

## 2017-07-12 RX ORDER — PANTOPRAZOLE SODIUM 40 MG/1
40 TABLET, DELAYED RELEASE ORAL EVERY MORNING
Qty: 30 TABLET | Refills: 1 | Status: SHIPPED | OUTPATIENT
Start: 2017-07-12

## 2017-07-12 RX ORDER — MECLIZINE HYDROCHLORIDE 25 MG/1
25 TABLET ORAL 2 TIMES DAILY PRN
Qty: 30 TABLET | Refills: 1 | Status: SHIPPED | OUTPATIENT
Start: 2017-07-12

## 2017-07-12 RX ORDER — ASPIRIN 81 MG/1
81 TABLET ORAL DAILY
Qty: 30 TABLET | Refills: 1 | Status: SHIPPED | OUTPATIENT
Start: 2017-07-12

## 2017-07-12 RX ORDER — DIGOXIN 125 MCG
125 TABLET ORAL
Qty: 30 TABLET | Refills: 1 | Status: SHIPPED | OUTPATIENT
Start: 2017-07-12

## 2017-07-12 RX ORDER — METOPROLOL SUCCINATE 50 MG/1
50 TABLET, EXTENDED RELEASE ORAL
Qty: 30 TABLET | Refills: 1 | Status: SHIPPED | OUTPATIENT
Start: 2017-07-12

## 2017-07-12 RX ORDER — ISOSORBIDE MONONITRATE 30 MG/1
30 TABLET, EXTENDED RELEASE ORAL
Qty: 30 TABLET | Refills: 1 | Status: SHIPPED | OUTPATIENT
Start: 2017-07-12

## 2017-07-12 RX ORDER — LOSARTAN POTASSIUM 25 MG/1
25 TABLET ORAL DAILY
Qty: 30 TABLET | Refills: 1 | Status: SHIPPED | OUTPATIENT
Start: 2017-07-12

## 2017-07-12 RX ADMIN — ISOSORBIDE MONONITRATE 30 MG: 30 TABLET, EXTENDED RELEASE ORAL at 09:22

## 2017-07-12 RX ADMIN — FUROSEMIDE 40 MG: 10 INJECTION, SOLUTION INTRAMUSCULAR; INTRAVENOUS at 05:48

## 2017-07-12 RX ADMIN — APIXABAN 2.5 MG: 2.5 TABLET, FILM COATED ORAL at 09:22

## 2017-07-12 RX ADMIN — ASPIRIN 81 MG: 81 TABLET, COATED ORAL at 09:22

## 2017-07-12 RX ADMIN — METOPROLOL SUCCINATE 50 MG: 50 TABLET, EXTENDED RELEASE ORAL at 09:22

## 2017-07-12 RX ADMIN — PANTOPRAZOLE SODIUM 40 MG: 40 TABLET, DELAYED RELEASE ORAL at 05:48

## 2017-07-12 RX ADMIN — MECLIZINE HYDROCHLORIDE 25 MG: 25 TABLET ORAL at 09:22

## 2017-07-12 RX ADMIN — LOSARTAN POTASSIUM 25 MG: 25 TABLET ORAL at 09:22

## 2017-07-12 NOTE — DISCHARGE INSTR - OTHER ORDERS
The anti-coagulation clinic will call to set up an appointment with you. They will work on getting your Eliquis approved through your insurance.

## 2017-07-12 NOTE — DISCHARGE SUMMARY
[facility] Medicine Services  DISCHARGE SUMMARY       Date of Admission: 7/8/2017  Date of Discharge:  7/12/2017  Primary Care Physician: Jose R Sanchez MD    Presenting Problem/History of Present Illness:  89-year-old male with history of BPH, dyslipidemia, hypertension who was admitted primarily for new onset atrial fibrillation with rapid ventricular response and left bundle branch block with slightly elevated troponin.   He also did complain of some abdominal discomfort nausea and constipation.  No history of chest pain, fever, chills, diaphoresis or shortness of air.    Final Discharge Diagnoses:  Hospital Problem List     Atrial fibrillation          Consults:   Consults     Date and Time Order Name Status Description    7/8/2017 0845 Inpatient Consult to Cardiology Completed           Procedures Performed: None.                Pertinent Test Results: CT scan of the abdomen and pelvis showed cardiomegaly with small bilateral pleural effusions, some infiltrative changes on the right middle lobe and right lower lobe consistent with either pneumonitis or atelectasis.  There are also some degenerative changes in the lumbar spine and prior cholecystectomy.  Echocardiogram showed left ventricular systolic function that is moderately decreased with the ejection fraction of 36%.  The left atrial cavity is severely dilated.    Chief Complaint on Day of Discharge: None    Hospital Course:  The patient was admitted to telemetry and commenced on Cardizem infusion, other rate controlling medications and Apixaban.  He was seen by the cardiologist and  was recommended optimal medical management and Cardizem infusion was weaned off.  He did spontaneously convert to normal sinus prior to discharge.  His hospital course was complicated by recurrent vertigo associated with nausea.  This problem has been ongoing with the patient for quite some time.  He was started on  "meclizine and subsequently became asymptomatic.   Patient was also on supplemental oxygen initially but this was weaned off and he was saturating more than 92% on room air on discharge.  Condition on Discharge:  Stable and improved.    Physical Exam on Discharge:  /58  Pulse 76  Temp 98.2 °F (36.8 °C) (Temporal Artery )   Resp 20  Ht 70\" (177.8 cm)  Wt 164 lb 1.6 oz (74.4 kg)  SpO2 95%  BMI 23.55 kg/m2  Physical Exam   Constitutional: He is oriented to person, place, and time. He appears well-developed and well-nourished. No distress.   HENT:   Head: Normocephalic and atraumatic.   Eyes: EOM are normal. Pupils are equal, round, and reactive to light.   Neck: Normal range of motion. Neck supple. No thyromegaly present.   Cardiovascular: Normal rate, regular rhythm and normal heart sounds.  Exam reveals no gallop.    No murmur heard.  Pulmonary/Chest: Effort normal and breath sounds normal. No respiratory distress. He has no wheezes. He has no rales.   Abdominal: Soft. Bowel sounds are normal. He exhibits no distension. There is no tenderness. There is no guarding.   Musculoskeletal: He exhibits no edema.   Neurological: He is alert and oriented to person, place, and time.   Skin: Skin is warm and dry. He is not diaphoretic. No erythema. No pallor.   Psychiatric: He has a normal mood and affect.   Vitals reviewed.        Discharge Disposition:  Home or Self Care    Discharge Medications:   Alexander Zamora   Home Medication Instructions JESS:[id number]    Printed on:07/12/17 1000   Medication Information                      apixaban (ELIQUIS) 2.5 MG tablet tablet  Take 1 tablet by mouth Every 12 (Twelve) Hours.             aspirin 81 MG EC tablet  Take 1 tablet by mouth Daily.             atorvastatin (LIPITOR) 10 MG tablet  Take 10 mg by mouth Every Night.             digoxin (LANOXIN) 125 MCG tablet  Take 1 tablet by mouth Daily.             furosemide (LASIX) 40 MG tablet  Take 1 tablet by mouth " Daily.             isosorbide mononitrate (IMDUR) 30 MG 24 hr tablet  Take 1 tablet by mouth Daily.             losartan (COZAAR) 25 MG tablet  Take 1 tablet by mouth Daily.             magnesium hydroxide (MILK OF MAGNESIA) 400 MG/5ML suspension  Take 30 mL by mouth Daily As Needed for Constipation.             meclizine (ANTIVERT) 25 MG tablet  Take 1 tablet by mouth 2 (Two) Times a Day As Needed for dizziness.             metoprolol succinate XL (TOPROL-XL) 50 MG 24 hr tablet  Take 1 tablet by mouth Daily.             pantoprazole (PROTONIX) 40 MG EC tablet  Take 1 tablet by mouth Every Morning.             polyethylene glycol (MIRALAX) packet  Take 17 g by mouth Daily As Needed.             tamsulosin (FLOMAX) 0.4 MG capsule 24 hr capsule  Take 1 capsule by mouth Every Night.                 Discharge Diet:  cardiac    Activity at Discharge:  as tolerated    Discharge Care Plan/Instructions: Given    Follow-up Appointments:   Follow with his family physician within one to 2 weeks.    Test Results Pending at Discharge:     Shalom Butcher MD  07/12/17  10:06 AM    Time: 45 minutes